# Patient Record
Sex: FEMALE | Race: WHITE | NOT HISPANIC OR LATINO | URBAN - METROPOLITAN AREA
[De-identification: names, ages, dates, MRNs, and addresses within clinical notes are randomized per-mention and may not be internally consistent; named-entity substitution may affect disease eponyms.]

---
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
## 2021-01-25 DIAGNOSIS — Z30.011 ENCOUNTER FOR INITIAL PRESCRIPTION OF CONTRACEPTIVE PILLS: ICD-10-CM

## 2021-01-25 RX ORDER — NORETHINDRONE ACETATE AND ETHINYL ESTRADIOL 1MG-20(21)
1 KIT ORAL
Qty: 84 TAB | Refills: 0 | Status: SHIPPED | OUTPATIENT
Start: 2021-01-25

## 2021-03-02 DIAGNOSIS — E10.9 TYPE 1 DIABETES MELLITUS WITHOUT COMPLICATION (HCC): ICD-10-CM

## 2021-03-02 RX ORDER — INSULIN ASPART 100 [IU]/ML
INJECTION, SOLUTION INTRAVENOUS; SUBCUTANEOUS
Qty: 15 ML | Refills: 0 | Status: SHIPPED | OUTPATIENT
Start: 2021-03-02 | End: 2024-02-09 | Stop reason: SDUPTHER

## 2021-10-04 RX ORDER — NORETHINDRONE ACETATE AND ETHINYL ESTRADIOL 1MG-20(24)
KIT ORAL
Qty: 28 TABLET | Refills: 11 | Status: SHIPPED | OUTPATIENT
Start: 2021-10-04

## 2021-10-04 NOTE — TELEPHONE ENCOUNTER
Received request via: Pharmacy    Was the patient seen in the last year in this department? Yes    Does the patient have an active prescription (recently filled or refills available) for medication(s) requested? No   PEDIATRICS

## 2022-01-22 ENCOUNTER — TELEPHONE (OUTPATIENT)
Dept: MEDICAL GROUP | Facility: OTHER | Age: 22
End: 2022-01-22

## 2022-01-22 DIAGNOSIS — E10.9 TYPE 1 DIABETES MELLITUS WITHOUT COMPLICATION (HCC): ICD-10-CM

## 2022-01-25 RX ORDER — PROCHLORPERAZINE 25 MG/1
1 SUPPOSITORY RECTAL
Qty: 9 EACH | Refills: 3 | Status: SHIPPED | OUTPATIENT
Start: 2022-01-25

## 2022-01-25 RX ORDER — PROCHLORPERAZINE 25 MG/1
1 SUPPOSITORY RECTAL
Qty: 1 EACH | Refills: 3 | Status: SHIPPED | OUTPATIENT
Start: 2022-01-25

## 2024-02-09 ENCOUNTER — HOSPITAL ENCOUNTER (OUTPATIENT)
Facility: MEDICAL CENTER | Age: 24
End: 2024-02-09
Attending: FAMILY MEDICINE
Payer: COMMERCIAL

## 2024-02-09 ENCOUNTER — OFFICE VISIT (OUTPATIENT)
Dept: MEDICAL GROUP | Facility: LAB | Age: 24
End: 2024-02-09
Payer: COMMERCIAL

## 2024-02-09 VITALS
SYSTOLIC BLOOD PRESSURE: 110 MMHG | RESPIRATION RATE: 16 BRPM | HEIGHT: 64 IN | WEIGHT: 169 LBS | HEART RATE: 96 BPM | TEMPERATURE: 98.2 F | DIASTOLIC BLOOD PRESSURE: 60 MMHG | BODY MASS INDEX: 28.85 KG/M2 | OXYGEN SATURATION: 96 %

## 2024-02-09 DIAGNOSIS — M25.562 CHRONIC PAIN OF BOTH KNEES: ICD-10-CM

## 2024-02-09 DIAGNOSIS — Z76.89 ENCOUNTER TO ESTABLISH CARE WITH NEW DOCTOR: ICD-10-CM

## 2024-02-09 DIAGNOSIS — Z13.220 SCREENING FOR HYPERLIPIDEMIA: ICD-10-CM

## 2024-02-09 DIAGNOSIS — Z11.3 SCREEN FOR STD (SEXUALLY TRANSMITTED DISEASE): ICD-10-CM

## 2024-02-09 DIAGNOSIS — E10.9 TYPE 1 DIABETES MELLITUS WITHOUT COMPLICATION (HCC): ICD-10-CM

## 2024-02-09 DIAGNOSIS — G89.29 CHRONIC PAIN OF BOTH KNEES: ICD-10-CM

## 2024-02-09 DIAGNOSIS — Z13.29 SCREENING FOR THYROID DISORDER: ICD-10-CM

## 2024-02-09 DIAGNOSIS — Z13.0 SCREENING FOR DEFICIENCY ANEMIA: ICD-10-CM

## 2024-02-09 DIAGNOSIS — Z23 NEED FOR VACCINATION: ICD-10-CM

## 2024-02-09 DIAGNOSIS — F41.9 ANXIETY: ICD-10-CM

## 2024-02-09 DIAGNOSIS — M25.561 CHRONIC PAIN OF BOTH KNEES: ICD-10-CM

## 2024-02-09 PROCEDURE — 3078F DIAST BP <80 MM HG: CPT | Performed by: FAMILY MEDICINE

## 2024-02-09 PROCEDURE — 99204 OFFICE O/P NEW MOD 45 MIN: CPT | Mod: 25 | Performed by: FAMILY MEDICINE

## 2024-02-09 PROCEDURE — 82570 ASSAY OF URINE CREATININE: CPT

## 2024-02-09 PROCEDURE — 90472 IMMUNIZATION ADMIN EACH ADD: CPT | Performed by: FAMILY MEDICINE

## 2024-02-09 PROCEDURE — 87491 CHLMYD TRACH DNA AMP PROBE: CPT

## 2024-02-09 PROCEDURE — 90471 IMMUNIZATION ADMIN: CPT | Performed by: FAMILY MEDICINE

## 2024-02-09 PROCEDURE — 87591 N.GONORRHOEAE DNA AMP PROB: CPT

## 2024-02-09 PROCEDURE — 82043 UR ALBUMIN QUANTITATIVE: CPT

## 2024-02-09 PROCEDURE — 90715 TDAP VACCINE 7 YRS/> IM: CPT | Performed by: FAMILY MEDICINE

## 2024-02-09 PROCEDURE — 3074F SYST BP LT 130 MM HG: CPT | Performed by: FAMILY MEDICINE

## 2024-02-09 PROCEDURE — 90651 9VHPV VACCINE 2/3 DOSE IM: CPT | Performed by: FAMILY MEDICINE

## 2024-02-09 RX ORDER — INSULIN DETEMIR 100 [IU]/ML
15 INJECTION, SOLUTION SUBCUTANEOUS EVERY EVENING
Qty: 45 ML | Refills: 6 | Status: SHIPPED | OUTPATIENT
Start: 2024-02-09

## 2024-02-09 RX ORDER — INSULIN ASPART 100 [IU]/ML
INJECTION, SOLUTION INTRAVENOUS; SUBCUTANEOUS
COMMUNITY
End: 2024-02-09

## 2024-02-09 RX ORDER — INSULIN ASPART 100 [IU]/ML
INJECTION, SOLUTION INTRAVENOUS; SUBCUTANEOUS
Qty: 15 ML | Refills: 0 | Status: SHIPPED | OUTPATIENT
Start: 2024-02-09 | End: 2024-02-16

## 2024-02-09 RX ORDER — NORETHINDRONE ACETATE AND ETHINYL ESTRADIOL 1MG-20(21)
1 KIT ORAL DAILY
COMMUNITY
End: 2024-02-09

## 2024-02-09 NOTE — PROGRESS NOTES
CC: Here to establish care    HPI: New patient, accompanied with her mother today  Danni presents today, discussed the following today:    1. Type 1 diabetes mellitus without complication  Chronic, new to me.  Diagnosed at the age of 10-year-old.  Patient on insulin, she said in the past she was on insulin pump but she had unfavorable experience so she is using long acting insulin and insulin per sliding scale.  Requesting referral to see endocrinology.  Does not recall when was her last A1c done or what is the result.  She said maybe around 10 or 9  Denies hypoglycemia episodes.  On Levemir 12 units at night and she takes 8 units as per sliding scale with each meal  And sometimes she takes it 4 or 5 times a day whenever she checks her blood sugar and finds it high.  2. Encounter to establish care with new doctor  23-year-old female with past medical history significant for type 1 diabetes, history of anxiety, reviewed past medical problems, past surgical history, family/social history today, patient is working as a .    3. Chronic pain of both knees  Chronic ongoing, bilateral knee pain would like to be checked out and possibly refer her to see orthopedics.  Denies trauma or injury at this time    4. Screen for STD (sexually transmitted disease)  Sexually active with 1 partner, requesting STD screening low risk    5. Need for vaccination  Denies acute illness or fever    6. Anxiety  Chronic ongoing, new to me.  Reports anxiety, would like to have referral to talk to a therapist.  Denies intentions to harm self or others    Patient Active Problem List    Diagnosis Date Noted    Long-term insulin use (HCC) 04/22/2020    Encounter for initial prescription of contraceptive pills 04/22/2020    Arthralgia of both knees 03/06/2019    Immunization deficiency 03/06/2019    Type 1 diabetes mellitus without complication (HCC) 12/20/2017    Anxiousness 12/20/2017       Current Outpatient Medications   Medication Sig  Dispense Refill    insulin detemir (LEVEMIR FLEXTOUCH) 100 UNIT/ML injection PEN Inject 15 Units under the skin every evening. 45 mL 6    insulin aspart (NOVOLOG FLEXPEN) 100 UNIT/ML injection PEN Inject 1-20 units at meals and snacks except the bedtime snack, maximum daily dose is 50 units. 15 mL 0    Insulin Pen Needle 32 G x 4 mm Use one pen needle in pen device to inject insulin four times daily. 100 Each 0    Continuous Blood Gluc Sensor (DEXCOM G6 SENSOR) Misc 1 Device every 10 days. 9 Each 3    Continuous Blood Gluc Transmit (DEXCOM G6 TRANSMITTER) Misc 1 Device every 3 months. 1 Each 3    norethindrone-ethinyl estradiol (BLISOVI FE 1/20) 1-20 MG-MCG per tablet Take 1 Tab by mouth every day. 84 Tab 0    Insulin Pen Needle 32 G x 4 mm (BD PEN NEEDLE MONSERRAT U/F) INJECT 6 TIMES DAILY 200 Each 5    TRUE METRIX BLOOD GLUCOSE TEST strip CHECK BLOOD SUGARS 6-10X PER DAY. 200 Strip 11    Glucagon, rDNA, (GLUCAGON EMERGENCY) 1 MG Kit 1 mg IM prn severe hypoglycemia 1 Kit 3    BLISOVI 24 FE 1-20 MG-MCG(24) Tab TAKE 1 TABLET BY MOUTH EVERY DAY 28 Tablet 11    acetone, urine, test (KETOSTIX) strip Test prn BS >300, up to 12 x per day. 100 Strip 11    Blood Glucose Monitoring Suppl (TRUE METRIX AIR GLUCOSE METER) Device 1 Each by Does not apply route as needed (check blood sugars). 1 Device 1     No current facility-administered medications for this visit.         Allergies as of 02/09/2024    (No Known Allergies)        ROS: Denies any chest pain, Shortness of breath, Changes bowel or bladder, Lower extremity edema.    Physical Exam:  Gen.: Well-developed, well-nourished, no apparent distress,pleasant and cooperative with the examination  Skin:  Warm and dry with good turgor. No rashes or suspicious lesions in visible areas  Eye: PERRLA, conjunctiva and sclera clear, lids normal  HEENT: Normocephalic/atraumatic, sinuses nontender with palpation, TMs clear, nares patent with pink mucosa and clear rhinorrhea, lips without  lesions, oropharynx clear.  Neck: Trachea midline,no masses or adenopathy  Thyroid: normal consistency and size. No masses or nodules. Not tender with palpation.  Cor: Regular rate and rhythm without murmur, gallop or rub.  Lungs: Respirations unlabored.Clear to auscultation with equal breath sounds bilaterally. No wheezes, rhonchi.  Abdomen: Soft nontender without hepatosplenomegaly or masses appreciated, normoactive bowel sounds. No hernias.  Extremities: No cyanosis, clubbing or edema, Symmetrical without deformities or malformations. Pulses 2+ and symmetrical both upper and lower extremities  Lymphatic: No abnormal adenopathy of the neck groin or axillae.  Psych: Alert and oriented x 3.Normal affect, judgement,insight and memory.    Monofilament testing with a 10 gram force: sensation intact: intact bilaterally  Visual Inspection: Feet without maceration, ulcers, fissures.  Pedal pulses: intact bilaterally     Assessment and Plan.   23 y.o. female here to establish care    1. Type 1 diabetes mellitus without complication (HCC)  Chronic, possibly uncontrolled recheck A1c, refills on medications follow-up with endocrinology.  Quality metrics reviewed, due for retinal exam next visit  - insulin detemir (LEVEMIR FLEXTOUCH) 100 UNIT/ML injection PEN; Inject 15 Units under the skin every evening.  Dispense: 45 mL; Refill: 6  - insulin aspart (NOVOLOG FLEXPEN) 100 UNIT/ML injection PEN; Inject 1-20 units at meals and snacks except the bedtime snack, maximum daily dose is 50 units.  Dispense: 15 mL; Refill: 0  - Insulin Pen Needle 32 G x 4 mm; Use one pen needle in pen device to inject insulin four times daily.  Dispense: 100 Each; Refill: 0  - Referral to Endocrinology  - Lipid Profile; Future  - Comp Metabolic Panel; Future  - HEMOGLOBIN A1C; Future  - Diabetic Monofilament LE Exam  - Microalbumin Creat Ratio Urine - Clinic Collect; Future    2. Encounter to establish care with new doctor  Reviewed medical history,  due for immunization.    3. Chronic pain of both knees  Chronic ongoing no history of trauma or injury, advised to do x-rays for further evaluation    - DX-KNEES-AP BILATERAL STANDING; Future    4. Screen for STD (sexually transmitted disease)   HEP C VIRUS ANTIBODY; Future  - HIV AG/AB COMBO ASSAY SCREENING; Future  - Chlamydia/GC, PCR (Urine); Future    5. Need for vaccination    - Gardasil 9  - Tdap =>6yo IM    6. Screening for hyperlipidemia    - Lipid Profile; Future    7. Screening for deficiency anemia    - CBC WITH DIFFERENTIAL; Future    8. Screening for thyroid disorder    - TSH WITH REFLEX TO FT4; Future    9. Anxiety  Chronic, no red flags at this time follow-up with behavioral health, will consider SSRI.  Rule out medical causes like thyroid disease.    - Referral to Behavioral Health       Please note that this dictation was created using voice recognition software. I have worked with consultants from the vendor as well as technical experts from [facility] to optimize the interface. I have made every reasonable attempt to correct obvious errors, but I expect that there are errors of grammar and possibly content that I did not discover before finalizing the note.

## 2024-02-10 LAB
C TRACH DNA SPEC QL NAA+PROBE: NEGATIVE
CREAT UR-MCNC: 81.24 MG/DL
MICROALBUMIN UR-MCNC: 16.6 MG/DL
MICROALBUMIN/CREAT UR: 204 MG/G (ref 0–30)
N GONORRHOEA DNA SPEC QL NAA+PROBE: NEGATIVE
SPECIMEN SOURCE: NORMAL

## 2024-02-12 DIAGNOSIS — E10.9 TYPE 1 DIABETES MELLITUS WITHOUT COMPLICATION (HCC): ICD-10-CM

## 2024-02-12 RX ORDER — LISINOPRIL 2.5 MG/1
2.5 TABLET ORAL DAILY
Qty: 90 TABLET | Refills: 3 | Status: SHIPPED | OUTPATIENT
Start: 2024-02-12

## 2024-02-14 DIAGNOSIS — E10.9 TYPE 1 DIABETES MELLITUS WITHOUT COMPLICATION (HCC): ICD-10-CM

## 2024-02-15 ENCOUNTER — APPOINTMENT (OUTPATIENT)
Dept: RADIOLOGY | Facility: MEDICAL CENTER | Age: 24
End: 2024-02-15
Attending: FAMILY MEDICINE
Payer: COMMERCIAL

## 2024-02-16 RX ORDER — INSULIN ASPART 100 [IU]/ML
INJECTION, SOLUTION INTRAVENOUS; SUBCUTANEOUS
Qty: 15 EACH | Refills: 3 | Status: SHIPPED | OUTPATIENT
Start: 2024-02-16

## 2024-02-27 ENCOUNTER — HOSPITAL ENCOUNTER (OUTPATIENT)
Dept: RADIOLOGY | Facility: MEDICAL CENTER | Age: 24
End: 2024-02-27
Attending: FAMILY MEDICINE
Payer: COMMERCIAL

## 2024-02-27 DIAGNOSIS — G89.29 CHRONIC PAIN OF BOTH KNEES: ICD-10-CM

## 2024-02-27 DIAGNOSIS — M25.562 CHRONIC PAIN OF BOTH KNEES: ICD-10-CM

## 2024-02-27 DIAGNOSIS — M25.561 CHRONIC PAIN OF BOTH KNEES: ICD-10-CM

## 2024-02-27 PROCEDURE — 73565 X-RAY EXAM OF KNEES: CPT

## 2024-04-19 DIAGNOSIS — E10.9 TYPE 1 DIABETES MELLITUS WITHOUT COMPLICATION (HCC): ICD-10-CM

## 2024-04-22 DIAGNOSIS — E10.9 TYPE 1 DIABETES MELLITUS WITHOUT COMPLICATION (HCC): ICD-10-CM

## 2024-04-22 RX ORDER — PROCHLORPERAZINE 25 MG/1
SUPPOSITORY RECTAL
Qty: 3 EACH | Refills: 8 | Status: SHIPPED | OUTPATIENT
Start: 2024-04-22

## 2024-04-22 RX ORDER — PEN NEEDLE, DIABETIC 31 GX5/16"
NEEDLE, DISPOSABLE MISCELLANEOUS
Qty: 100 EACH | Refills: 3 | Status: SHIPPED | OUTPATIENT
Start: 2024-04-22

## 2024-05-03 ENCOUNTER — OFFICE VISIT (OUTPATIENT)
Dept: URGENT CARE | Facility: PHYSICIAN GROUP | Age: 24
End: 2024-05-03
Payer: COMMERCIAL

## 2024-05-03 ENCOUNTER — HOSPITAL ENCOUNTER (OUTPATIENT)
Facility: MEDICAL CENTER | Age: 24
End: 2024-05-03
Payer: COMMERCIAL

## 2024-05-03 VITALS
RESPIRATION RATE: 12 BRPM | WEIGHT: 163.14 LBS | DIASTOLIC BLOOD PRESSURE: 72 MMHG | BODY MASS INDEX: 27.85 KG/M2 | SYSTOLIC BLOOD PRESSURE: 108 MMHG | OXYGEN SATURATION: 98 % | TEMPERATURE: 98.2 F | HEIGHT: 64 IN | HEART RATE: 92 BPM

## 2024-05-03 DIAGNOSIS — N39.0 ACUTE UTI: ICD-10-CM

## 2024-05-03 LAB
APPEARANCE UR: NORMAL
BILIRUB UR STRIP-MCNC: NEGATIVE MG/DL
COLOR UR AUTO: YELLOW
GLUCOSE BLD-MCNC: 296 MG/DL (ref 65–99)
GLUCOSE UR STRIP.AUTO-MCNC: >=1000 MG/DL
KETONES UR STRIP.AUTO-MCNC: 15 MG/DL
LEUKOCYTE ESTERASE UR QL STRIP.AUTO: NORMAL
NITRITE UR QL STRIP.AUTO: POSITIVE
PH UR STRIP.AUTO: 5.5 [PH] (ref 5–8)
POCT INT CON NEG: NEGATIVE
POCT INT CON POS: POSITIVE
POCT URINE PREGNANCY TEST: NEGATIVE
PROT UR QL STRIP: NEGATIVE MG/DL
RBC UR QL AUTO: NORMAL
SP GR UR STRIP.AUTO: 1.01
UROBILINOGEN UR STRIP-MCNC: 0.2 MG/DL

## 2024-05-03 PROCEDURE — 3074F SYST BP LT 130 MM HG: CPT

## 2024-05-03 PROCEDURE — 81002 URINALYSIS NONAUTO W/O SCOPE: CPT

## 2024-05-03 PROCEDURE — 3078F DIAST BP <80 MM HG: CPT

## 2024-05-03 PROCEDURE — 99213 OFFICE O/P EST LOW 20 MIN: CPT

## 2024-05-03 PROCEDURE — 81025 URINE PREGNANCY TEST: CPT

## 2024-05-03 PROCEDURE — 82962 GLUCOSE BLOOD TEST: CPT

## 2024-05-03 RX ORDER — NITROFURANTOIN 25; 75 MG/1; MG/1
100 CAPSULE ORAL 2 TIMES DAILY
Qty: 10 CAPSULE | Refills: 0 | Status: SHIPPED | OUTPATIENT
Start: 2024-05-03 | End: 2024-05-08

## 2024-05-03 ASSESSMENT — ENCOUNTER SYMPTOMS
NAUSEA: 0
SHORTNESS OF BREATH: 0
FEVER: 0
COUGH: 0
CHANGE IN BOWEL HABIT: 0
DIARRHEA: 0
HEADACHES: 0
FLANK PAIN: 0
VOMITING: 0
ABDOMINAL PAIN: 0
MYALGIAS: 0
SORE THROAT: 0
CHILLS: 0

## 2024-05-03 NOTE — PROGRESS NOTES
"Subjective:   Danni Smiley is a 24 y.o. female who presents for UTI (Dysuria, frequency, cloudy x5 days. Took azo once yesterday. Currently sexually active and using condoms. )      UTI  This is a new problem. Episode onset: x5 days. The problem has been gradually worsening. Associated symptoms include urinary symptoms. Pertinent negatives include no abdominal pain, change in bowel habit, chest pain, chills, congestion, coughing, fever, headaches, myalgias, nausea, rash, sore throat or vomiting. Nothing aggravates the symptoms. Treatments tried: AZO.       Review of Systems   Constitutional:  Negative for chills, fever and malaise/fatigue.   HENT:  Negative for congestion, ear pain, hearing loss and sore throat.    Respiratory:  Negative for cough and shortness of breath.    Cardiovascular:  Negative for chest pain.   Gastrointestinal:  Negative for abdominal pain, change in bowel habit, diarrhea, nausea and vomiting.   Genitourinary:  Positive for dysuria, frequency and urgency. Negative for flank pain and hematuria.   Musculoskeletal:  Negative for myalgias.   Skin:  Negative for rash.   Neurological:  Negative for headaches.       Medications, Allergies, and current problem list reviewed today in Epic.     Objective:     /72 (BP Location: Left arm, Patient Position: Sitting, BP Cuff Size: Adult)   Pulse 92   Temp 36.8 °C (98.2 °F) (Temporal)   Resp 12   Ht 1.626 m (5' 4\")   Wt 74 kg (163 lb 2.3 oz)   SpO2 98%     Physical Exam  Vitals and nursing note reviewed.   Constitutional:       Appearance: Normal appearance.   HENT:      Head: Normocephalic and atraumatic.      Right Ear: Tympanic membrane normal.      Left Ear: Tympanic membrane normal.      Nose: Nose normal.      Mouth/Throat:      Mouth: Mucous membranes are moist.   Eyes:      Conjunctiva/sclera: Conjunctivae normal.   Cardiovascular:      Rate and Rhythm: Normal rate.      Heart sounds: Normal heart sounds.   Pulmonary:      " Effort: Pulmonary effort is normal.   Abdominal:      General: Abdomen is flat.      Palpations: Abdomen is soft.      Tenderness: There is no abdominal tenderness. There is no right CVA tenderness or left CVA tenderness.   Genitourinary:     Vagina: No vaginal discharge.   Musculoskeletal:         General: Normal range of motion.      Cervical back: Normal range of motion.   Skin:     General: Skin is warm and dry.      Capillary Refill: Capillary refill takes less than 2 seconds.   Neurological:      Mental Status: She is alert and oriented to person, place, and time.   Psychiatric:         Mood and Affect: Mood normal.         Behavior: Behavior normal.       Results for orders placed or performed in visit on 05/03/24   POCT Urinalysis   Result Value Ref Range    POC Color Yellow Negative    POC Appearance Cloudy Negative    POC Glucose >=1,000 Negative mg/dL    POC Bilirubin Negative Negative mg/dL    POC Ketones 15 Negative mg/dL    POC Specific Gravity 1.015 <1.005 - >1.030    POC Blood Trace-lysed Negative    POC Urine PH 5.5 5.0 - 8.0    POC Protein Negative Negative mg/dL    POC Urobiligen 0.2 Negative (0.2) mg/dL    POC Nitrites Positive Negative    POC Leukocyte Esterase Trace Negative   POCT Pregnancy   Result Value Ref Range    POC Urine Pregnancy Test Negative     Internal Control Positive Positive     Internal Control Negative Negative    POCT glucose   Result Value Ref Range    Glucose - Accu-Ck 296 (A) 65 - 99 mg/dL         Assessment/Plan:       1. Acute UTI  POCT Urinalysis    POCT Pregnancy    POCT glucose    nitrofurantoin (MACROBID) 100 MG Cap    URINE CULTURE(NEW)        After ROS and physical exam patient has had urinary symptoms for the past 5 days including frequency, dysuria, and urgency.  Patient denies any hematuria or flank pain.  Patient has had no fevers.  Patient is a diabetic and well-controlled on insulin.  Urine results in office are inconclusive due to the Azo treatment.  But  since patient is diabetic and showing greater than 1000 glucose in urine POC glucose was performed.  Patient's glucose was 296 in office.  Patient reports that she recently went and worked out and avoids using too much of insulin prior to this to avoid low blood sugars.  Patient has not had any recent issues with blood sugars in the last time she was in DKA was roughly 6 years ago when she had the flu.  Patient was instructed to monitor for any worsening signs and symptoms.  Patient instructed to take antibiotic as prescribed.  Urine will be sent for culture and patient will be contacted about these results.  Patient was instructed that she can continue taking Azo for symptom management.  Patient instructed that if no improvement after 2 or 3 days on antibiotics to return to urgent care or emergency department for further management.    Differential diagnosis, natural history, and supportive care discussed. We also reviewed side effects of medication including allergic response, GI upset, tendon injury, rash, sedation etc. Patient and/or guardian voices understanding.      Advised the patient to follow-up with the primary care physician for recheck, reevaluation, and consideration of further management.    I personally reviewed prior external notes and test results pertinent to today's visit as well as additional imaging and testing completed in clinic today.     Please note that this dictation was created using voice recognition software. I have made every reasonable attempt to correct obvious errors, but I expect that there are errors of grammar and possibly content that I did not discover before finalizing the note.    This note was electronically signed by PLACIDO Aranda

## 2024-05-06 LAB
BACTERIA UR CULT: ABNORMAL
BACTERIA UR CULT: ABNORMAL
SIGNIFICANT IND 70042: ABNORMAL
SITE SITE: ABNORMAL
SOURCE SOURCE: ABNORMAL

## 2024-05-09 DIAGNOSIS — E10.9 TYPE 1 DIABETES MELLITUS WITHOUT COMPLICATION (HCC): ICD-10-CM

## 2024-05-10 RX ORDER — PROCHLORPERAZINE 25 MG/1
1 SUPPOSITORY RECTAL
Qty: 1 EACH | Refills: 6 | Status: SHIPPED | OUTPATIENT
Start: 2024-05-10

## 2024-05-10 NOTE — TELEPHONE ENCOUNTER
Received request via: Pharmacy    Was the patient seen in the last year in this department? Yes 2/9/24    Does the patient have an active prescription (recently filled or refills available) for medication(s) requested? No    Pharmacy Name: [facility]    Does the patient have MCFP Plus and need 100 day supply (blood pressure, diabetes and cholesterol meds only)? Patient does not have SCP

## 2024-05-22 ENCOUNTER — OFFICE VISIT (OUTPATIENT)
Dept: URGENT CARE | Facility: PHYSICIAN GROUP | Age: 24
End: 2024-05-22
Payer: COMMERCIAL

## 2024-05-22 ENCOUNTER — HOSPITAL ENCOUNTER (OUTPATIENT)
Facility: MEDICAL CENTER | Age: 24
End: 2024-05-22
Attending: PHYSICIAN ASSISTANT
Payer: COMMERCIAL

## 2024-05-22 VITALS
RESPIRATION RATE: 23 BRPM | HEIGHT: 64 IN | DIASTOLIC BLOOD PRESSURE: 60 MMHG | HEART RATE: 108 BPM | TEMPERATURE: 98.7 F | OXYGEN SATURATION: 96 % | SYSTOLIC BLOOD PRESSURE: 120 MMHG | WEIGHT: 161.82 LBS | BODY MASS INDEX: 27.63 KG/M2

## 2024-05-22 DIAGNOSIS — R30.0 DYSURIA: ICD-10-CM

## 2024-05-22 DIAGNOSIS — N39.0 RECURRENT UTI: ICD-10-CM

## 2024-05-22 DIAGNOSIS — N30.90 CYSTITIS: ICD-10-CM

## 2024-05-22 LAB
APPEARANCE UR: CLEAR
BILIRUB UR STRIP-MCNC: NEGATIVE MG/DL
COLOR UR AUTO: YELLOW
GLUCOSE UR STRIP.AUTO-MCNC: NORMAL MG/DL
KETONES UR STRIP.AUTO-MCNC: NORMAL MG/DL
LEUKOCYTE ESTERASE UR QL STRIP.AUTO: NORMAL
NITRITE UR QL STRIP.AUTO: NEGATIVE
PH UR STRIP.AUTO: 5.5 [PH] (ref 5–8)
POCT INT CON NEG: NEGATIVE
POCT INT CON POS: POSITIVE
POCT URINE PREGNANCY TEST: NEGATIVE
PROT UR QL STRIP: NEGATIVE MG/DL
RBC UR QL AUTO: NORMAL
SP GR UR STRIP.AUTO: 1.01
UROBILINOGEN UR STRIP-MCNC: NORMAL MG/DL

## 2024-05-22 PROCEDURE — 99213 OFFICE O/P EST LOW 20 MIN: CPT | Performed by: PHYSICIAN ASSISTANT

## 2024-05-22 PROCEDURE — 3074F SYST BP LT 130 MM HG: CPT | Performed by: PHYSICIAN ASSISTANT

## 2024-05-22 PROCEDURE — 81025 URINE PREGNANCY TEST: CPT | Performed by: PHYSICIAN ASSISTANT

## 2024-05-22 PROCEDURE — 3078F DIAST BP <80 MM HG: CPT | Performed by: PHYSICIAN ASSISTANT

## 2024-05-22 PROCEDURE — 81002 URINALYSIS NONAUTO W/O SCOPE: CPT | Performed by: PHYSICIAN ASSISTANT

## 2024-05-22 RX ORDER — SULFAMETHOXAZOLE AND TRIMETHOPRIM 800; 160 MG/1; MG/1
1 TABLET ORAL EVERY 12 HOURS
Qty: 10 TABLET | Refills: 0 | Status: SHIPPED | OUTPATIENT
Start: 2024-05-22 | End: 2024-05-27

## 2024-05-22 NOTE — PROGRESS NOTES
"Subjective:   Danni Smiley is a 24 y.o. female who presents for UTI (Sx started 3 day ago , hurts to pee. )  This is a very pleasant 23-year-old female who presents with chief complaint of dysuria, urgency, frequency x 1-1/2 days.  Seen on May 3 for similar, diagnosed with cystitis, urine culture positive for E. coli pansensitive.  Completed Macrobid course after this and noted resolution of symptoms.  Symptoms recurred yesterday.  She is sexually active using condoms.  She has no STI concerns.  She denies vaginal discharge itching or odor.  LMP 5-1.  She is diabetic.  She denies flank pain nausea or vomiting.  No fevers or chills.        Medications:  B-D ULTRAFINE III SHORT PEN Misc  BD Pen Needle Agata U/F Misc  Blisovi 24 Fe Tabs  Dexcom G6 Sensor Misc  Dexcom G6 Transmitter Misc  Glucagon Emergency Kit  Ketostix Strp  Levemir FlexTouch Sopn  lisinopril Tabs  norethindrone-ethinyl estradiol  NovoLOG FlexPen Sopn  True Metrix Air Glucose Meter Haydee  True Metrix Blood Glucose Test Strp    Allergies:             Patient has no known allergies.    Surgical History:       No past surgical history on file.    Past Social Hx:  Danni Smiley  reports that she has never smoked. She has never used smokeless tobacco. She reports that she does not currently use alcohol. She reports current drug use. Drug: Marijuana.     Past Family Hx:   Danni Smiley family history includes Thyroid in her mother.       Problem list, medications, and allergies reviewed by myself today in Epic.     Objective:     /60 (BP Location: Left arm, Patient Position: Sitting, BP Cuff Size: Large adult)   Pulse (!) 108   Temp 37.1 °C (98.7 °F) (Temporal)   Resp (!) 23   Ht 1.626 m (5' 4\")   Wt 73.4 kg (161 lb 13.1 oz)   LMP 04/30/2024 (Exact Date)   SpO2 96%   BMI 27.78 kg/m²     Physical Exam  Vitals and nursing note reviewed.   Constitutional:       General: She is not in acute distress.     Appearance: Normal " appearance. She is not ill-appearing, toxic-appearing or diaphoretic.      Comments: This is a nontoxic-appearing adult in no apparent distress   HENT:      Nose: Nose normal.   Eyes:      Extraocular Movements: Extraocular movements intact.   Cardiovascular:      Rate and Rhythm: Normal rate.      Pulses: Normal pulses.      Heart sounds: Normal heart sounds.   Pulmonary:      Effort: Pulmonary effort is normal. No respiratory distress.      Breath sounds: Normal breath sounds.   Abdominal:      General: There is no distension.      Palpations: Abdomen is soft.      Tenderness: There is no abdominal tenderness. There is no right CVA tenderness, left CVA tenderness, guarding or rebound.      Hernia: No hernia is present.      Comments: No CVA tenderness  No suprapubic tenderness   Musculoskeletal:      Cervical back: No rigidity.   Neurological:      Mental Status: She is alert and oriented to person, place, and time.   Psychiatric:         Mood and Affect: Mood normal.         Behavior: Behavior is cooperative.       Urine hCG negative  Assessment/Plan:     Diagnosis and Associated Orders:     1. Dysuria  - POCT Urinalysis  - POCT PREGNANCY  - URINE CULTURE(NEW); Future    2. Cystitis  - URINE CULTURE(NEW); Future  - sulfamethoxazole-trimethoprim (BACTRIM DS) 800-160 MG tablet; Take 1 Tablet by mouth every 12 hours for 5 days.  Dispense: 10 Tablet; Refill: 0    3. Recurrent UTI        Comments/MDM:  Reviewed patient's prior office visit.  Reviewed prior urine culture which was positive for E. coli, pansensitive.  UA today suggestive of recurrent UTI.  Will reculture.  Will start on Bactrim.  Discussed with patient signs and symptoms are consistent with a simple urinary tract infection. They are overall very well-appearing with normal vital signs and benign examination findings.  Increase fluid intake, AZO per manufacturers instructions for burning urination.    Urine culture: will call back only if positive and if  necessary change in therapy.     Advised to return to the Urgent Care or follow up with their PCP if symptoms are not improving in 2-3 days or sooner if any worsening symptoms such as fever, chills, abdominal pain, back/flank pain, nausea, vomiting, or any other concerns.     I personally reviewed prior external notes and test results pertinent to today's visit. Supportive care, natural history, differential diagnoses, and indications for immediate follow-up discussed. Return to clinic or go to ED if symptoms worsen or persist.  Red flag symptoms discussed.  Patient/Parent/Guardian voices understanding. Follow-up with your primary care provider in 3-5 days.  All side effects of medication discussed including allergic response, GI upset, tendon injury, rash, sedation etc    Please note that this dictation was created using voice recognition software. I have made a reasonable attempt to correct obvious errors, but I expect that there are errors of grammar and possibly content that I did not discover before finalizing the note.    This note was electronically signed by Fartun Boo PA-C

## 2024-05-25 LAB
BACTERIA UR CULT: NORMAL
SIGNIFICANT IND 70042: NORMAL
SITE SITE: NORMAL
SOURCE SOURCE: NORMAL

## 2024-05-31 ENCOUNTER — APPOINTMENT (OUTPATIENT)
Dept: MEDICAL GROUP | Facility: LAB | Age: 24
End: 2024-05-31
Payer: COMMERCIAL

## 2024-06-03 ENCOUNTER — APPOINTMENT (OUTPATIENT)
Dept: MEDICAL GROUP | Facility: LAB | Age: 24
End: 2024-06-03
Payer: COMMERCIAL

## 2024-06-03 VITALS
BODY MASS INDEX: 28 KG/M2 | DIASTOLIC BLOOD PRESSURE: 70 MMHG | RESPIRATION RATE: 16 BRPM | OXYGEN SATURATION: 100 % | WEIGHT: 164 LBS | HEIGHT: 64 IN | HEART RATE: 90 BPM | TEMPERATURE: 97.8 F | SYSTOLIC BLOOD PRESSURE: 126 MMHG

## 2024-06-03 DIAGNOSIS — Z23 NEED FOR VACCINATION: ICD-10-CM

## 2024-06-03 DIAGNOSIS — E10.9 TYPE 1 DIABETES MELLITUS WITHOUT COMPLICATION (HCC): ICD-10-CM

## 2024-06-03 DIAGNOSIS — Z30.9 ENCOUNTER FOR CONTRACEPTIVE MANAGEMENT, UNSPECIFIED TYPE: ICD-10-CM

## 2024-06-03 LAB
POCT INT CON NEG: NEGATIVE
POCT INT CON POS: POSITIVE
POCT URINE PREGNANCY TEST: NEGATIVE

## 2024-06-03 PROCEDURE — 90471 IMMUNIZATION ADMIN: CPT | Performed by: FAMILY MEDICINE

## 2024-06-03 PROCEDURE — 3074F SYST BP LT 130 MM HG: CPT | Performed by: FAMILY MEDICINE

## 2024-06-03 PROCEDURE — 99214 OFFICE O/P EST MOD 30 MIN: CPT | Mod: 25 | Performed by: FAMILY MEDICINE

## 2024-06-03 PROCEDURE — 90472 IMMUNIZATION ADMIN EACH ADD: CPT | Performed by: FAMILY MEDICINE

## 2024-06-03 PROCEDURE — 90621 MENB-FHBP VACC 2/3 DOSE IM: CPT | Performed by: FAMILY MEDICINE

## 2024-06-03 PROCEDURE — 90651 9VHPV VACCINE 2/3 DOSE IM: CPT | Performed by: FAMILY MEDICINE

## 2024-06-03 PROCEDURE — 81025 URINE PREGNANCY TEST: CPT | Performed by: FAMILY MEDICINE

## 2024-06-03 PROCEDURE — 3078F DIAST BP <80 MM HG: CPT | Performed by: FAMILY MEDICINE

## 2024-06-03 RX ORDER — DESOGESTREL AND ETHINYL ESTRADIOL 0.15-0.03
1 KIT ORAL DAILY
Qty: 84 TABLET | Refills: 3 | Status: SHIPPED | OUTPATIENT
Start: 2024-06-03

## 2024-06-04 NOTE — PROGRESS NOTES
"Chief Complaint   Patient presents with    Contraception       Verbal consent was acquired by the patient to use [employer] ambient listening note generation during this visit Yes      HPI:  History of Present Illness  The patient is a 24-year-old female who presents for evaluation of multiple medical concerns. She is accompanied by an adult female, her mother.    The patient expresses a desire for contraception, her only concern about contraception she wants to make sure that it does not affect her weight lifting exercise that she started recently   primarily due to her engagement in weight lifting and muscle building. She expresses uncertainty regarding the potential side effects of oral contraceptives. H    er previous contraceptive regimen involved the use of oral contraceptives, which ceased her menstrual cycle without any associated side effects. Her last menstrual period commenced on 06 / 01/2024, and she last engaged in sexual intercourse last Friday, coinciding with a single male partner.    The patient expresses interest in receiving the HPV vaccine.    The patient has not yet consulted with an optometrist.    The patient has a diagnosis of type 1 diabetes, which is exacerbated by her weight lifting. She is not currently under the care of an endocrinologist and has expressed disinterest in using an insulin pump.              Exam:         /70 (BP Location: Left arm, Patient Position: Sitting, BP Cuff Size: Adult)   Pulse 90   Temp 36.6 °C (97.8 °F)   Resp 16   Ht 1.626 m (5' 4\")   Wt 74.4 kg (164 lb)   LMP 06/01/2024   SpO2 100%   BMI 28.15 kg/m²   Gen.: Well-developed, well-nourished, no apparent distress, pleasant and cooperative with the examination  HEENT: Normocephalic/atraumatic, sinuses nontender with palpation, TMs clear, nares patent with pink mucosa and clear rhinorrhea, oropharynx clear  Neck: No JVD or bruits, no adenopathy  Cor: Regular rate and rhythm without murmur gallop or " rub  Lungs: Clear to auscultation with equal breath sounds bilaterally. No wheezes, rhonchi.  Abdomen: Soft nontender without hepatosplenomegaly or masses appreciated, normoactive bowel sounds  Extremities: No cyanosis, clubbing or edema      A/P    1. Encounter for contraceptive management, unspecified type  Negative pregnancy test, patient to start birth control pills after discussing all the options for hormonal and nonhormonal contraceptive methods, patient elected for pills.  Prescription sent to pharmacy, discussed condoms for STD prevention  - POCT Pregnancy  - desogestrel-ethinyl estradiol (APRI) 0.15-30 MG-MCG per tablet; Take 1 Tablet by mouth every day.  Dispense: 84 Tablet; Refill: 3    2. Need for vaccination    - Gardasil 9  - Meningococcal (IM) Group B    3. Type 1 diabetes mellitus without complication (HCC)  Chronic, A1c is above 7, discussed follow-up with endocrinology for discussion of insulin pump.  - Referral to Endocrinology     Please note that this dictation was created using voice recognition software. I have worked with consultants from the vendor as well as technical experts from [facility] to optimize the interface. I have made every reasonable attempt to correct obvious errors, but I expect that there are errors of grammar and possibly content that I did not discover before finalizing the note.

## 2024-06-10 ENCOUNTER — HOSPITAL ENCOUNTER (OUTPATIENT)
Dept: LAB | Facility: MEDICAL CENTER | Age: 24
End: 2024-06-10
Attending: FAMILY MEDICINE
Payer: COMMERCIAL

## 2024-06-10 DIAGNOSIS — Z13.29 SCREENING FOR THYROID DISORDER: ICD-10-CM

## 2024-06-10 DIAGNOSIS — E10.9 TYPE 1 DIABETES MELLITUS WITHOUT COMPLICATION (HCC): ICD-10-CM

## 2024-06-10 DIAGNOSIS — Z13.0 SCREENING FOR DEFICIENCY ANEMIA: ICD-10-CM

## 2024-06-10 DIAGNOSIS — Z11.3 SCREEN FOR STD (SEXUALLY TRANSMITTED DISEASE): ICD-10-CM

## 2024-06-10 DIAGNOSIS — Z13.220 SCREENING FOR HYPERLIPIDEMIA: ICD-10-CM

## 2024-06-10 LAB
ALBUMIN SERPL BCP-MCNC: 4.3 G/DL (ref 3.2–4.9)
ALBUMIN/GLOB SERPL: 1.3 G/DL
ALP SERPL-CCNC: 100 U/L (ref 30–99)
ALT SERPL-CCNC: 10 U/L (ref 2–50)
ANION GAP SERPL CALC-SCNC: 13 MMOL/L (ref 7–16)
AST SERPL-CCNC: 14 U/L (ref 12–45)
BASOPHILS # BLD AUTO: 0.7 % (ref 0–1.8)
BASOPHILS # BLD: 0.04 K/UL (ref 0–0.12)
BILIRUB SERPL-MCNC: 0.4 MG/DL (ref 0.1–1.5)
BUN SERPL-MCNC: 16 MG/DL (ref 8–22)
CALCIUM ALBUM COR SERPL-MCNC: 9.4 MG/DL (ref 8.5–10.5)
CALCIUM SERPL-MCNC: 9.6 MG/DL (ref 8.5–10.5)
CHLORIDE SERPL-SCNC: 102 MMOL/L (ref 96–112)
CHOLEST SERPL-MCNC: 195 MG/DL (ref 100–199)
CO2 SERPL-SCNC: 22 MMOL/L (ref 20–33)
CREAT SERPL-MCNC: 0.76 MG/DL (ref 0.5–1.4)
EOSINOPHIL # BLD AUTO: 0.08 K/UL (ref 0–0.51)
EOSINOPHIL NFR BLD: 1.4 % (ref 0–6.9)
ERYTHROCYTE [DISTWIDTH] IN BLOOD BY AUTOMATED COUNT: 41.5 FL (ref 35.9–50)
EST. AVERAGE GLUCOSE BLD GHB EST-MCNC: 197 MG/DL
FASTING STATUS PATIENT QL REPORTED: NORMAL
GFR SERPLBLD CREATININE-BSD FMLA CKD-EPI: 113 ML/MIN/1.73 M 2
GLOBULIN SER CALC-MCNC: 3.2 G/DL (ref 1.9–3.5)
GLUCOSE SERPL-MCNC: 208 MG/DL (ref 65–99)
HBA1C MFR BLD: 8.5 % (ref 4–5.6)
HCT VFR BLD AUTO: 41.9 % (ref 37–47)
HDLC SERPL-MCNC: 58 MG/DL
HGB BLD-MCNC: 13.9 G/DL (ref 12–16)
HIV 1+2 AB+HIV1 P24 AG SERPL QL IA: NORMAL
IMM GRANULOCYTES # BLD AUTO: 0.05 K/UL (ref 0–0.11)
IMM GRANULOCYTES NFR BLD AUTO: 0.9 % (ref 0–0.9)
LDLC SERPL CALC-MCNC: 124 MG/DL
LYMPHOCYTES # BLD AUTO: 1.82 K/UL (ref 1–4.8)
LYMPHOCYTES NFR BLD: 31.6 % (ref 22–41)
MCH RBC QN AUTO: 29.7 PG (ref 27–33)
MCHC RBC AUTO-ENTMCNC: 33.2 G/DL (ref 32.2–35.5)
MCV RBC AUTO: 89.5 FL (ref 81.4–97.8)
MONOCYTES # BLD AUTO: 0.32 K/UL (ref 0–0.85)
MONOCYTES NFR BLD AUTO: 5.6 % (ref 0–13.4)
NEUTROPHILS # BLD AUTO: 3.45 K/UL (ref 1.82–7.42)
NEUTROPHILS NFR BLD: 59.8 % (ref 44–72)
NRBC # BLD AUTO: 0 K/UL
NRBC BLD-RTO: 0 /100 WBC (ref 0–0.2)
PLATELET # BLD AUTO: 273 K/UL (ref 164–446)
PMV BLD AUTO: 11.2 FL (ref 9–12.9)
POTASSIUM SERPL-SCNC: 4.2 MMOL/L (ref 3.6–5.5)
PROT SERPL-MCNC: 7.5 G/DL (ref 6–8.2)
RBC # BLD AUTO: 4.68 M/UL (ref 4.2–5.4)
SODIUM SERPL-SCNC: 137 MMOL/L (ref 135–145)
TRIGL SERPL-MCNC: 67 MG/DL (ref 0–149)
WBC # BLD AUTO: 5.8 K/UL (ref 4.8–10.8)

## 2024-06-10 PROCEDURE — 80053 COMPREHEN METABOLIC PANEL: CPT

## 2024-06-10 PROCEDURE — 85025 COMPLETE CBC W/AUTO DIFF WBC: CPT

## 2024-06-10 PROCEDURE — 80061 LIPID PANEL: CPT

## 2024-06-10 PROCEDURE — 84443 ASSAY THYROID STIM HORMONE: CPT

## 2024-06-10 PROCEDURE — 36415 COLL VENOUS BLD VENIPUNCTURE: CPT

## 2024-06-10 PROCEDURE — 86803 HEPATITIS C AB TEST: CPT

## 2024-06-10 PROCEDURE — 83036 HEMOGLOBIN GLYCOSYLATED A1C: CPT

## 2024-06-10 PROCEDURE — 87389 HIV-1 AG W/HIV-1&-2 AB AG IA: CPT

## 2024-06-11 LAB
HCV AB SER QL: NORMAL
TSH SERPL DL<=0.005 MIU/L-ACNC: 1.11 UIU/ML (ref 0.38–5.33)

## 2024-07-09 ENCOUNTER — DOCUMENTATION (OUTPATIENT)
Dept: HEALTH INFORMATION MANAGEMENT | Facility: OTHER | Age: 24
End: 2024-07-09
Payer: COMMERCIAL

## 2024-10-17 ENCOUNTER — APPOINTMENT (OUTPATIENT)
Dept: MEDICAL GROUP | Age: 24
End: 2024-10-17
Payer: COMMERCIAL

## 2024-12-19 ENCOUNTER — OFFICE VISIT (OUTPATIENT)
Dept: URGENT CARE | Facility: PHYSICIAN GROUP | Age: 24
End: 2024-12-19
Payer: COMMERCIAL

## 2024-12-19 ENCOUNTER — HOSPITAL ENCOUNTER (OUTPATIENT)
Facility: MEDICAL CENTER | Age: 24
End: 2024-12-19
Attending: PHYSICIAN ASSISTANT
Payer: COMMERCIAL

## 2024-12-19 VITALS
HEART RATE: 115 BPM | DIASTOLIC BLOOD PRESSURE: 80 MMHG | OXYGEN SATURATION: 97 % | TEMPERATURE: 99 F | WEIGHT: 172.29 LBS | RESPIRATION RATE: 16 BRPM | SYSTOLIC BLOOD PRESSURE: 120 MMHG | BODY MASS INDEX: 29.41 KG/M2 | HEIGHT: 64 IN

## 2024-12-19 DIAGNOSIS — R30.0 DYSURIA: ICD-10-CM

## 2024-12-19 DIAGNOSIS — N39.0 URINARY TRACT INFECTION WITHOUT HEMATURIA, SITE UNSPECIFIED: ICD-10-CM

## 2024-12-19 LAB
APPEARANCE UR: NORMAL
BILIRUB UR STRIP-MCNC: NORMAL MG/DL
COLOR UR AUTO: NORMAL
GLUCOSE UR STRIP.AUTO-MCNC: NORMAL MG/DL
KETONES UR STRIP.AUTO-MCNC: NORMAL MG/DL
LEUKOCYTE ESTERASE UR QL STRIP.AUTO: NORMAL
NITRITE UR QL STRIP.AUTO: POSITIVE
PH UR STRIP.AUTO: 5 [PH] (ref 5–8)
POCT INT CON NEG: NEGATIVE
POCT INT CON POS: POSITIVE
POCT URINE PREGNANCY TEST: NEGATIVE
PROT UR QL STRIP: NORMAL MG/DL
RBC UR QL AUTO: NORMAL
SP GR UR STRIP.AUTO: 1.02
UROBILINOGEN UR STRIP-MCNC: NORMAL MG/DL

## 2024-12-19 PROCEDURE — 81002 URINALYSIS NONAUTO W/O SCOPE: CPT | Performed by: PHYSICIAN ASSISTANT

## 2024-12-19 PROCEDURE — 99213 OFFICE O/P EST LOW 20 MIN: CPT | Performed by: PHYSICIAN ASSISTANT

## 2024-12-19 PROCEDURE — 87077 CULTURE AEROBIC IDENTIFY: CPT

## 2024-12-19 PROCEDURE — 81025 URINE PREGNANCY TEST: CPT | Performed by: PHYSICIAN ASSISTANT

## 2024-12-19 PROCEDURE — 87086 URINE CULTURE/COLONY COUNT: CPT

## 2024-12-19 PROCEDURE — 3079F DIAST BP 80-89 MM HG: CPT | Performed by: PHYSICIAN ASSISTANT

## 2024-12-19 PROCEDURE — 3074F SYST BP LT 130 MM HG: CPT | Performed by: PHYSICIAN ASSISTANT

## 2024-12-19 PROCEDURE — 87186 SC STD MICRODIL/AGAR DIL: CPT

## 2024-12-19 RX ORDER — CEFDINIR 300 MG/1
300 CAPSULE ORAL 2 TIMES DAILY
Qty: 10 CAPSULE | Refills: 0 | Status: SHIPPED | OUTPATIENT
Start: 2024-12-19 | End: 2024-12-24

## 2024-12-19 ASSESSMENT — ENCOUNTER SYMPTOMS
ABDOMINAL PAIN: 0
NAUSEA: 0
VOMITING: 0
FLANK PAIN: 0
EYE DISCHARGE: 0
EYE REDNESS: 0
FEVER: 0

## 2024-12-19 ASSESSMENT — FIBROSIS 4 INDEX: FIB4 SCORE: 0.37

## 2024-12-19 NOTE — PROGRESS NOTES
Subjective     Danni Stefanie Smiley is a 24 y.o. female who presents with Dysuria (Painful urination, smell to urine, cloudiness in urine, frequent urination, sharp pain when urinating X 1 week )            UTI  This is a new problem. Episode onset: x 1 week ago. The problem has been gradually worsening. Associated symptoms include urinary symptoms (Patient reports associated dysuria with urinary frequency and urinary urgency.  She reports no hematuria.  She also reports no flank pain.). Pertinent negatives include no abdominal pain, fever, nausea or vomiting. Treatments tried: OTC Azo.     The patient's past medical history is significant for type 1 diabetes.  The patient states her blood sugar has been running a little higher, which she attributes to her birth control medication.    PMH:  has a past medical history of Diabetes ([facility]).  MEDS:   Current Outpatient Medications:     desogestrel-ethinyl estradiol (APRI) 0.15-30 MG-MCG per tablet, Take 1 Tablet by mouth every day., Disp: 84 Tablet, Rfl: 3    Continuous Glucose Transmitter (DEXCOM G6 TRANSMITTER) Misc, 1 Device every 3 months., Disp: 1 Each, Rfl: 6    Continuous Blood Gluc Sensor (DEXCOM G6 SENSOR) Misc, USE 1 DEVICE AS DIRECTED TO CHECK BG 4X A DAY. CHANGE EVERY 10 DAYS, Disp: 3 Each, Rfl: 8    Insulin Pen Needle (B-D ULTRAFINE III SHORT PEN), USE ONE PEN NEEDLE IN PEN DEVICE TO INJECT INSULIN FOUR TIMES DAILY., Disp: 100 Each, Rfl: 3    insulin aspart (NOVOLOG FLEXPEN) 100 UNIT/ML injection PEN, INJECT 1-20 UNITS AT MEALS AND SNACKS EXCEPT THE BEDTIME SNACK, MAXIMUM DAILY DOSE IS 50 UNITS., Disp: 15 Each, Rfl: 3    lisinopril (PRINIVIL) 2.5 MG Tab, Take 1 Tablet by mouth every day., Disp: 90 Tablet, Rfl: 3    insulin detemir (LEVEMIR FLEXTOUCH) 100 UNIT/ML injection PEN, Inject 15 Units under the skin every evening., Disp: 45 mL, Rfl: 6    Insulin Pen Needle 32 G x 4 mm (BD PEN NEEDLE MONSERRAT U/F), INJECT 6 TIMES DAILY, Disp: 200 Each, Rfl: 5    TRUE  "METRIX BLOOD GLUCOSE TEST strip, CHECK BLOOD SUGARS 6-10X PER DAY., Disp: 200 Strip, Rfl: 11    Glucagon, rDNA, (GLUCAGON EMERGENCY) 1 MG Kit, 1 mg IM prn severe hypoglycemia, Disp: 1 Kit, Rfl: 3    Blood Glucose Monitoring Suppl (TRUE METRIX AIR GLUCOSE METER) Device, 1 Each by Does not apply route as needed (check blood sugars)., Disp: 1 Device, Rfl: 1    norethindrone-ethinyl estradiol (BLISOVI FE 1/20) 1-20 MG-MCG per tablet, Take 1 Tab by mouth every day. (Patient not taking: Reported on 12/19/2024), Disp: 84 Tab, Rfl: 0    acetone, urine, test (KETOSTIX) strip, Test prn BS >300, up to 12 x per day. (Patient not taking: Reported on 5/22/2024), Disp: 100 Strip, Rfl: 11  ALLERGIES: No Known Allergies  SURGHX: History reviewed. No pertinent surgical history.  SOCHX:  reports that she has never smoked. She has never used smokeless tobacco. She reports that she does not currently use alcohol. She reports current drug use. Drug: Marijuana.  FH: Family history was reviewed, no pertinent findings to report    Review of Systems   Constitutional:  Negative for fever.   Eyes:  Negative for discharge and redness.   Gastrointestinal:  Negative for abdominal pain, nausea and vomiting.   Genitourinary:  Positive for dysuria, frequency and urgency. Negative for flank pain and hematuria.              Objective     /80 (BP Location: Right arm, Patient Position: Sitting, BP Cuff Size: Adult)   Pulse (!) 115   Temp 37.2 °C (99 °F) (Temporal)   Resp 16   Ht 1.626 m (5' 4\")   Wt 78.1 kg (172 lb 4.6 oz)   SpO2 97%   BMI 29.57 kg/m²      Physical Exam  Constitutional:       General: She is not in acute distress.     Appearance: Normal appearance. She is well-developed. She is not ill-appearing.   HENT:      Head: Normocephalic and atraumatic.      Right Ear: External ear normal.      Left Ear: External ear normal.   Eyes:      Extraocular Movements: Extraocular movements intact.      Conjunctiva/sclera: Conjunctivae " normal.   Cardiovascular:      Rate and Rhythm: Normal rate and regular rhythm.      Heart sounds: Normal heart sounds.   Pulmonary:      Effort: Pulmonary effort is normal. No respiratory distress.      Breath sounds: Normal breath sounds. No wheezing.   Abdominal:      Palpations: Abdomen is soft.      Tenderness: There is no abdominal tenderness. There is no right CVA tenderness or left CVA tenderness.   Musculoskeletal:         General: Normal range of motion.      Cervical back: Normal range of motion and neck supple.   Skin:     General: Skin is warm and dry.   Neurological:      Mental Status: She is alert and oriented to person, place, and time.                  Progress:  The patient's POCT urinalysis today in clinic is inconclusive secondary to Azo use.  Results for orders placed or performed in visit on 12/19/24   POCT Pregnancy    Collection Time: 12/19/24 11:24 AM   Result Value Ref Range    POC Urine Pregnancy Test Negative     Internal Control Positive Positive     Internal Control Negative Negative    POCT Urinalysis    Collection Time: 12/19/24 11:26 AM   Result Value Ref Range    POC Color orange Negative    POC Appearance cloudy Negative    POC Glucose 500 mg/dL Negative mg/dL    POC Bilirubin small Negative mg/dL    POC Ketones 80 mg/dL Negative mg/dL    POC Specific Gravity 1.025 <1.005 - >1.030    POC Blood small Negative    POC Urine PH 5.0 5.0 - 8.0    POC Protein 100 mg/dL Negative mg/dL    POC Urobiligen 1.0 E.U./dL Negative (0.2) mg/dL    POC Nitrites positive Negative    POC Leukocyte Esterase large Negative         Urine Culture - pending            Assessment & Plan        Assessment & Plan  Dysuria    Orders:    POCT Urinalysis    POCT Pregnancy    URINE CULTURE(NEW); Future    Urinary tract infection without hematuria, site unspecified    Orders:    cefdinir (OMNICEF) 300 MG Cap; Take 1 Capsule by mouth 2 times a day for 5 days.            The patient's presenting symptoms and  physical exam findings are consistent with dysuria likely secondary to an acute urinary tract infection.  The patient's physical exam today in clinic was normal.  The patient is nontoxic and appears in no acute distress.  The patient's vital signs are stable and within normal limits, with the exception of a mildly elevated heart rate.  The patient's heart was found to be elevated upon arrival, but returned to within normal limits on examination.  She is afebrile today in clinic.  The patient's POCT urinalysis today in clinic was inconclusive secondary to Azo use.  The patient's POCT pregnancy test was negative.  Will culture the patient's urine to identify a likely bacterial source.  Based on the patient's presenting symptoms, will prescribe the patient cefdinir for presumed urinary tract infection.  Advised the patient to monitor for worsening signs or symptoms.  Recommend OTC medications and supportive care for symptomatic management.  Recommend patient follow-up with primary care as needed.  Discussed return precautions with the patient, and she verbalized understanding.    Differential diagnoses, supportive care, and indications for immediate follow-up discussed with patient.   Instructed to return to clinic or nearest emergency department for any change in condition, further concerns, or worsening of symptoms.    OTC Tylenol or Motrin for fever/discomfort.  Drink plenty of fluids  Follow-up with PCP  Return to clinic or go to the ED if symptoms worsen or fail to improve, or if the patient should develop worsening/increasing urinary symptoms, hematuria, flank pain, abdominal pain, nausea/vomiting, fever/chills, and/or any concerning symptoms.    Discussed plan with the patient, and she agrees to the above.     I personally reviewed prior external notes and test results pertinent to today's visit.  I have independently reviewed and interpreted all diagnostics ordered during this urgent care visit.     Please note  that this dictation was created using voice recognition software. I have made every reasonable attempt to correct obvious errors, but I expect that there may be errors of grammar and possibly content that I did not discover before finalizing the note.     This note was electronically signed by Kimmie Isaacs PA-C

## 2025-02-09 NOTE — PROGRESS NOTES
Chief Complaint:  Consult requested by Pcp Not In Computer for initial evaluation of Type 1 Diabetes Mellitus    HPI:   Danni Smiley is a 24 y.o. female was referred to endocrinology service by PCP to establish care and T1DM management.  Here with her mom - Lorena.     DM history:  - diagnosed in 2010, presented with DKA  - hospitalizations for DKA/HHS/severe hyperglycemia/severe hypoglycemia in the past: at presentation, 2nd time with DKA with flu in 2017  - prior therapy: MDI now, previously was on tandem  - known DM complications: nephropathy with microalbuminuria vs microalbuminuria secondary to UTI  - latest HbA1C - 8.1% (2025)  - pertinent PMHx:   -- dyslipidemia  -- denies NAFLD, HTN; CAD/PAD/CHF/ CVA     Current therapy:  Novolog ISF 1:10, ISF 1:50 for BG > 150    Levemir 17 units HS 10:30 pm     Tolerates medications: well  Compliant: yes    Other important medications:  Desogestrel-ethinyl estradiol 0.15 - 30  Lisinopril 2.5 mg    BG control:  CGM type - Dexcom G6  Period - 24 - 2025  Data were reviewed and discussed with the patient  Active time - 97%  ABG - 218 ±76 mg/dL  GV - 35%  GMI - 8.5%  TIR - 35%  TAR - high - 33%, very high - 31%  TBR - low - 1%, very low < 1 %  24 - 2025       Hypoglycemic episodes:  Hypoglycemic symptoms: Yes  Hypoglycemic unawareness: NA  Treatment: sandwich  Severe hypoglycemia: No  Has medical bracelet/necklace: No  Has glucagon emergency kit: Yes, but     Past Medical History:  Patient Active Problem List    Diagnosis Date Noted    Long-term insulin use (HCC) 2020    Encounter for initial prescription of contraceptive pills 2020    Arthralgia of both knees 2019    Immunization deficiency 2019    Type 1 diabetes mellitus without complication (HCC) 2017    Anxiousness 2017     Past Surgical History:  No past surgical history on file.     Allergies:  Patient has no known allergies.     Social  History:  Social History     Socioeconomic History    Marital status: Single     Spouse name: Not on file    Number of children: Not on file    Years of education: Not on file    Highest education level: Not on file   Occupational History     Comment:    Tobacco Use    Smoking status: Never    Smokeless tobacco: Never   Vaping Use    Vaping status: Some Days   Substance and Sexual Activity    Alcohol use: Not Currently     Comment: occationally    Drug use: Yes     Types: Marijuana    Sexual activity: Yes     Partners: Male     Birth control/protection: Condom Male   Other Topics Concern    Behavioral problems Not Asked    Interpersonal relationships Not Asked    Sad or not enjoying activities Not Asked    Suicidal thoughts Not Asked    Poor school performance Not Asked    Reading difficulties Not Asked    Speech difficulties Not Asked    Writing difficulties Not Asked    Inadequate sleep Not Asked    Excessive TV viewing Not Asked    Excessive video game use Not Asked    Inadequate exercise Not Asked    Sports related Not Asked    Poor diet Not Asked    Family concerns for drug/alcohol abuse Not Asked    Poor oral hygiene Not Asked    Bike safety Not Asked    Family concerns vehicle safety Not Asked   Social History Narrative    Not on file     Social Drivers of Health     Financial Resource Strain: Not on file   Food Insecurity: Not on file   Transportation Needs: Not on file   Physical Activity: Not on file   Stress: Not on file   Social Connections: Not on file   Intimate Partner Violence: Not on file   Housing Stability: Not on file      Family History:   Family History   Problem Relation Age of Onset    Thyroid Mother      Current Medications:  Current Outpatient Medications:     desogestrel-ethinyl estradiol (APRI) 0.15-30 MG-MCG per tablet, Take 1 Tablet by mouth every day., Disp: 84 Tablet, Rfl: 3    Continuous Glucose Transmitter (DEXCOM G6 TRANSMITTER) Misc, 1 Device every 3 months., Disp: 1  "Each, Rfl: 6    Continuous Blood Gluc Sensor (DEXCOM G6 SENSOR) Misc, USE 1 DEVICE AS DIRECTED TO CHECK BG 4X A DAY. CHANGE EVERY 10 DAYS, Disp: 3 Each, Rfl: 8    Insulin Pen Needle (B-D ULTRAFINE III SHORT PEN), USE ONE PEN NEEDLE IN PEN DEVICE TO INJECT INSULIN FOUR TIMES DAILY., Disp: 100 Each, Rfl: 3    insulin aspart (NOVOLOG FLEXPEN) 100 UNIT/ML injection PEN, INJECT 1-20 UNITS AT MEALS AND SNACKS EXCEPT THE BEDTIME SNACK, MAXIMUM DAILY DOSE IS 50 UNITS., Disp: 15 Each, Rfl: 3    lisinopril (PRINIVIL) 2.5 MG Tab, Take 1 Tablet by mouth every day., Disp: 90 Tablet, Rfl: 3    insulin detemir (LEVEMIR FLEXTOUCH) 100 UNIT/ML injection PEN, Inject 15 Units under the skin every evening., Disp: 45 mL, Rfl: 6    norethindrone-ethinyl estradiol (BLISOVI FE 1/20) 1-20 MG-MCG per tablet, Take 1 Tab by mouth every day. (Patient not taking: Reported on 12/19/2024), Disp: 84 Tab, Rfl: 0    Insulin Pen Needle 32 G x 4 mm (BD PEN NEEDLE MONSERRAT U/F), INJECT 6 TIMES DAILY, Disp: 200 Each, Rfl: 5    TRUE METRIX BLOOD GLUCOSE TEST strip, CHECK BLOOD SUGARS 6-10X PER DAY., Disp: 200 Strip, Rfl: 11    Glucagon, rDNA, (GLUCAGON EMERGENCY) 1 MG Kit, 1 mg IM prn severe hypoglycemia, Disp: 1 Kit, Rfl: 3    acetone, urine, test (KETOSTIX) strip, Test prn BS >300, up to 12 x per day. (Patient not taking: Reported on 5/22/2024), Disp: 100 Strip, Rfl: 11    Blood Glucose Monitoring Suppl (TRUE METRIX AIR GLUCOSE METER) Device, 1 Each by Does not apply route as needed (check blood sugars)., Disp: 1 Device, Rfl: 1    PHYSICAL EXAM:   Vital signs: /70   Pulse (!) 118   Ht 1.626 m (5' 4\") Comment: pt stated  Wt 79.6 kg (175 lb 8 oz)   SpO2 96%   BMI 30.12 kg/m²   GENERAL: Well-developed, well-nourished  in no apparent distress.   EYE: No ocular and eyelid asymmetry, Anicteric sclerae,  PERRL, No exophthalmos or lidlag  HENT: Hearing grossly intact, Normocephalic, atraumatic.  NECK: Supple. Trachea midline.   CARDIOVASCULAR: Regular " rate and rhythm.   LUNGS: No dyspnea  ABDOMEN: Soft, nondistended  EXTREMITIES: No clubbing, cyanosis, or edema.   NEUROLOGICAL: Cranial nerves II-XII are grossly intact No visible tremor with both outstretched hands  SKIN: No rashes, lesions. Turgor is normal.    Labs:  - reviewed  HbA1C/BG/Hb:   Reference Range  10/19/18  03/06/19  06/10/24    HbA1C 4.0 - 5.6 % 9.4 (H) 9.2 (H) 8.5 (H)      Reference Range 06/10/24    Hb 12.0 - 16.0 g/dL 13.9   Hct 37.0 - 47.0 % 41.9     C-peptide/glucose/T1DM Abs:  No data    Kidney function/MACR:   Reference Range 02/09/24  06/10/24    Creatinine 0.50 - 1.40 mg/dL  0.76   GFR (CKD-EPI) >60 mL/min/1.73 m 2  113   MACR 0 - 30 mg/g 204 (H)      LFTs/FIB-4:   Reference Range 06/10/24    Platelet Count 164 - 446 K/uL 273   AST(SGOT) 12 - 45 U/L 14   ALT(SGPT) 2 - 50 U/L 10   ALP 30 - 99 U/L 100 (H)     Lipid panel:   Reference Range  06/10/24    Triglycerides 0 - 149 mg/dL 67   HDL >=40 mg/dL 58   LDL <100 mg/dL 124 (H)     Hypothyroidism screening:   Reference Range 06/10/24    TSH 0.380 - 5.330 uIU/mL 1.110       ASSESSMENT/PLAN:   # Type 1 diabetes with hyperglycemia  - duration x 14 years  - on MDI  - HbA1C - 8.1% (2/2025) vs GMI - 8.5% x Dexcom G6     Microvascular complications: nephropathy with microalbuminuria, denies peripheral neuropathy, retinopathy  Macrovascular complications: denies CAD, CVA, PAD  Associated comorbidities: dyslipidemia     DM complication screening:  Labs reviewed:  MACR - 204 (+), last checked in 2/2024  Creat/GFR , last checked in  LDL - 99 - not at target, last checked in      Patient is taking statin: on   Patient is taking ASA: no  Patient is taking ACE/ARB: on lisinopril 2.5 mg     Last eye exam: due for eye exam  Last foot exam: due for foot exam     Home medications:  Novolog ISF 1:10, ISF 1:50 for BG > 150    Levemir 17 units HS 10:30 pm     Discussion:  - patient has longstanding type 1 diabetes on MDI  - discussed that Levemir is not the  preferred basal insulin as it does not last 24 hours, will transition to Toujeo  - discussed general principles of basal insulin adjustment  - patient is interested in closed-loop insulin pump, discussed available options on the market, initially patient was interested in tubeless pump, but then she swing towards Tandem and Medtronic pumps also because they have better algorithms; given contact information for medical representatives to clarify cost issues  - encouraged patient to report all her doses of insulin in here CGM application  - will transition from Dexcom G6 -> G7 for patient's convenience    Plan:  Discussed with the patient goals for DM management:  Fasting BG 90 - 130  2 hrs postprandial  - 180  HbA1C < 7.0%  ABG < 154 mg/dL  TIR > 70%  TBR < 4%     Therapy adjustments:  - stop Levemir -> start Toujeo 17 units daily  - continue Novolog ICR 1:10, ISF 1:50 for BG > 150  - encouraged patient to report all dose of insulin in CGM application  - given contact information for medical representatives for Tandem and Medtronic pumps  - provide refills for the patient's medications    3.  Transition from Dexcom G6 -> G7  4. Given instructions for foot care  5. Discussed hypoglycemia symptoms, management, given glucagon prescription.   6.  Referral for eye exam.  7.  Will do foot exam at upcoming visit.    # Diabetes nephropathy with microalbuminuria vs microalbuminuria due to UTI  - already on ACEI  - repeat MACR  - keep BP < 130/80 mm Hg  - provide better glycemic control    # Obesity class 1  - continue assessment of patient's  - optimization of insulin therapy with insulin pump might help    RTC: 2 mo    Total time (face-to-face and non-face-to face time): 60 min -  extensive discussion of diagnoses, treatment, prognosis, medical charts, lab review, documentation.     Plan reviewed with the patient and agreed with plan.  All questions answered to patient's satisfaction.  Thank you kindly for allowing me  to participate in the diabetes care plan for this patient.    Teena Romero MD    CC:   Pcp Not In Computer

## 2025-02-11 ENCOUNTER — OFFICE VISIT (OUTPATIENT)
Dept: ENDOCRINOLOGY | Facility: MEDICAL CENTER | Age: 25
End: 2025-02-11
Attending: STUDENT IN AN ORGANIZED HEALTH CARE EDUCATION/TRAINING PROGRAM
Payer: COMMERCIAL

## 2025-02-11 VITALS
BODY MASS INDEX: 29.96 KG/M2 | DIASTOLIC BLOOD PRESSURE: 70 MMHG | SYSTOLIC BLOOD PRESSURE: 122 MMHG | HEIGHT: 64 IN | HEART RATE: 118 BPM | WEIGHT: 175.5 LBS | OXYGEN SATURATION: 96 %

## 2025-02-11 DIAGNOSIS — E10.9 TYPE 1 DIABETES MELLITUS WITHOUT COMPLICATION (HCC): Primary | ICD-10-CM

## 2025-02-11 LAB
HBA1C MFR BLD: 8.1 % (ref ?–5.8)
POCT INT CON NEG: NEGATIVE
POCT INT CON POS: POSITIVE

## 2025-02-11 PROCEDURE — 83036 HEMOGLOBIN GLYCOSYLATED A1C: CPT | Performed by: STUDENT IN AN ORGANIZED HEALTH CARE EDUCATION/TRAINING PROGRAM

## 2025-02-11 PROCEDURE — 3074F SYST BP LT 130 MM HG: CPT | Performed by: STUDENT IN AN ORGANIZED HEALTH CARE EDUCATION/TRAINING PROGRAM

## 2025-02-11 PROCEDURE — 95250 CONT GLUC MNTR PHYS/QHP EQP: CPT | Performed by: STUDENT IN AN ORGANIZED HEALTH CARE EDUCATION/TRAINING PROGRAM

## 2025-02-11 PROCEDURE — 99204 OFFICE O/P NEW MOD 45 MIN: CPT | Performed by: STUDENT IN AN ORGANIZED HEALTH CARE EDUCATION/TRAINING PROGRAM

## 2025-02-11 PROCEDURE — 95251 CONT GLUC MNTR ANALYSIS I&R: CPT | Performed by: STUDENT IN AN ORGANIZED HEALTH CARE EDUCATION/TRAINING PROGRAM

## 2025-02-11 PROCEDURE — 3078F DIAST BP <80 MM HG: CPT | Performed by: STUDENT IN AN ORGANIZED HEALTH CARE EDUCATION/TRAINING PROGRAM

## 2025-02-11 PROCEDURE — 99213 OFFICE O/P EST LOW 20 MIN: CPT | Performed by: STUDENT IN AN ORGANIZED HEALTH CARE EDUCATION/TRAINING PROGRAM

## 2025-02-11 RX ORDER — GLUCAGON 3 MG/1
3 POWDER NASAL
Qty: 1 EACH | Refills: 11 | Status: SHIPPED | OUTPATIENT
Start: 2025-02-11

## 2025-02-11 RX ORDER — INSULIN ASPART 100 [IU]/ML
INJECTION, SOLUTION INTRAVENOUS; SUBCUTANEOUS
Qty: 45 ML | Refills: 4 | Status: SHIPPED | OUTPATIENT
Start: 2025-02-11

## 2025-02-11 RX ORDER — INSULIN GLARGINE 300 U/ML
17 INJECTION, SOLUTION SUBCUTANEOUS
Qty: 9 ML | Refills: 4 | Status: SHIPPED | OUTPATIENT
Start: 2025-02-11

## 2025-02-11 RX ORDER — BLOOD SUGAR DIAGNOSTIC
1 STRIP MISCELLANEOUS 4 TIMES DAILY
Qty: 400 EACH | Refills: 4 | Status: SHIPPED | OUTPATIENT
Start: 2025-02-11

## 2025-02-11 RX ORDER — ACYCLOVIR 400 MG/1
1 TABLET ORAL
Qty: 9 EACH | Refills: 4 | Status: SHIPPED | OUTPATIENT
Start: 2025-02-11

## 2025-02-11 RX ORDER — CALCIUM CITRATE/VITAMIN D3 200MG-6.25
1 TABLET ORAL
Qty: 200 STRIP | Refills: 5 | Status: SHIPPED | OUTPATIENT
Start: 2025-02-11

## 2025-02-11 ASSESSMENT — FIBROSIS 4 INDEX: FIB4 SCORE: 0.37

## 2025-02-12 NOTE — Clinical Note
REFERRAL APPROVAL NOTICE         Sent on February 12, 2025                   Danni Stefanie Smiley  [address] [facility] [place], [place] [address]                   Dear Ms. Smiley,    After a careful review of the medical information and benefit coverage, [facility] has processed your referral. See below for additional details.    If applicable, you must be actively enrolled with your insurance for coverage of the authorized service. If you have any questions regarding your coverage, please contact your insurance directly.    REFERRAL INFORMATION   Referral #:  [id number]  Referred-To Provider    Referred-By Provider:  Ophthalmology    Teena Romero M.D.   [facility]      [address]  [address]  [facility] [address]  [phone] [address] [facility] [address]  [phone]    Referral Start Date:  02/11/2025  Referral End Date:   02/11/2026             SCHEDULING  If you do not already have an appointment, please call [phone] to make an appointment.     MORE INFORMATION  If you do not already have a [email or web address] account, sign up at: [email or web address]  You can access your medical information, make appointments, see lab results, billing information, and more.  If you have questions regarding this referral, please contact  the [facility] Referrals department at:             [phone]. Monday - Friday 8:00AM - 5:00PM.     Sincerely,    [facility]

## 2025-03-29 NOTE — PROGRESS NOTES
Follow up Endocrinology Visit  Initial consult/last visit on:   Referred by:     Chief complaint:  Danni Smiley, 25 y.o., female, who is here for follow up    Interval history:   - since last visit    Chief Complaint:  Consult requested by Pcp Not In Computer for initial evaluation of Type 1 Diabetes Mellitus    HPI:   Danni Smiley is a 24 y.o. female was referred to endocrinology service by PCP to establish care and T1DM management.  Here with her mom - Lorena.     DM history:  - diagnosed in 2010, presented with DKA  - hospitalizations for DKA/HHS/severe hyperglycemia/severe hypoglycemia in the past: at presentation, 2nd time with DKA with flu in 2017  - prior therapy: MDI now, previously was on tandem  - known DM complications: nephropathy with microalbuminuria vs microalbuminuria secondary to UTI  - latest HbA1C - 8.1% (2025)  - pertinent PMHx:   -- dyslipidemia  -- denies NAFLD, HTN; CAD/PAD/CHF/ CVA     Current therapy:  Novolog ISF 1:10, ISF 1:50 for BG > 150    Levemir 17 units HS 10:30 pm     Tolerates medications: well  Compliant: yes    Other important medications:  Desogestrel-ethinyl estradiol 0.15 - 30  Lisinopril 2.5 mg    BG control:  CGM type - Dexcom G6  Period - 24 - 2025  Data were reviewed and discussed with the patient  Active time - 97%  ABG - 218 ±76 mg/dL  GV - 35%  GMI - 8.5%  TIR - 35%  TAR - high - 33%, very high - 31%  TBR - low - 1%, very low < 1 %  24 - 2025       Hypoglycemic episodes:  Hypoglycemic symptoms: Yes  Hypoglycemic unawareness: NA  Treatment: sandwich  Severe hypoglycemia: No  Has medical bracelet/necklace: No  Has glucagon emergency kit: Yes, but     Past Medical History:  Patient Active Problem List    Diagnosis Date Noted    Long-term insulin use (HCC) 2020    Encounter for initial prescription of contraceptive pills 2020    Arthralgia of both knees 2019    Immunization deficiency 2019    Type 1  diabetes mellitus without complication (HCC) 12/20/2017    Anxiousness 12/20/2017     Past Surgical History:  No past surgical history on file.     Allergies:  Patient has no known allergies.     Social History:  Social History     Socioeconomic History    Marital status: Single     Spouse name: Not on file    Number of children: Not on file    Years of education: Not on file    Highest education level: Not on file   Occupational History     Comment:    Tobacco Use    Smoking status: Never    Smokeless tobacco: Never   Vaping Use    Vaping status: Some Days   Substance and Sexual Activity    Alcohol use: Not Currently     Comment: occationally    Drug use: Yes     Types: Marijuana    Sexual activity: Yes     Partners: Male     Birth control/protection: Condom Male   Other Topics Concern    Behavioral problems Not Asked    Interpersonal relationships Not Asked    Sad or not enjoying activities Not Asked    Suicidal thoughts Not Asked    Poor school performance Not Asked    Reading difficulties Not Asked    Speech difficulties Not Asked    Writing difficulties Not Asked    Inadequate sleep Not Asked    Excessive TV viewing Not Asked    Excessive video game use Not Asked    Inadequate exercise Not Asked    Sports related Not Asked    Poor diet Not Asked    Family concerns for drug/alcohol abuse Not Asked    Poor oral hygiene Not Asked    Bike safety Not Asked    Family concerns vehicle safety Not Asked   Social History Narrative    Not on file     Social Drivers of Health     Financial Resource Strain: Not on file   Food Insecurity: Not on file   Transportation Needs: Not on file   Physical Activity: Not on file   Stress: Not on file   Social Connections: Not on file   Intimate Partner Violence: Not on file   Housing Stability: Not on file      Family History:   Family History   Problem Relation Age of Onset    Thyroid Mother      Current Medications:  Current Outpatient Medications:     Continuous Glucose  Sensor (DEXCOM G7 SENSOR) Misc, 1 Each every 10 days., Disp: 9 Each, Rfl: 4    Glucagon (BAQSIMI ONE PACK) 3 mg/dose, Administer 1 Spray into one nostril 1 time a day as needed (for severe hypoglycemia)., Disp: 1 Each, Rfl: 11    Insulin Glargine, 1 Unit Dial, (TOUJEO SOLOSTAR) 300 UNIT/ML Solution Pen-injector, Inject 17 Units under the skin at bedtime., Disp: 9 mL, Rfl: 4    glucose blood (TRUE METRIX BLOOD GLUCOSE TEST) strip, 1 Strip by Other route 2 (two) times a day., Disp: 200 Strip, Rfl: 5    Insulin Pen Needle (PEN NEEDLES) 32G X 6 MM Misc, 1 Each 4 times a day., Disp: 400 Each, Rfl: 4    Urine Glucose-Ketones Test Strip, 1 Strip by In Vitro route as needed (to check if nausea, vomiting, abdominal pain, BGs> 300s)., Disp: 10 Strip, Rfl: 11    insulin aspart (NOVOLOG FLEXPEN) 100 UNIT/ML injection PEN, Inject 1-20 units at meals and snacks except the bedtime snack, maximum daily dose is 50 units., Disp: 45 mL, Rfl: 4    desogestrel-ethinyl estradiol (APRI) 0.15-30 MG-MCG per tablet, Take 1 Tablet by mouth every day., Disp: 84 Tablet, Rfl: 3    Continuous Glucose Transmitter (DEXCOM G6 TRANSMITTER) Misc, 1 Device every 3 months., Disp: 1 Each, Rfl: 6    lisinopril (PRINIVIL) 2.5 MG Tab, Take 1 Tablet by mouth every day., Disp: 90 Tablet, Rfl: 3    norethindrone-ethinyl estradiol (BLISOVI FE 1/20) 1-20 MG-MCG per tablet, Take 1 Tab by mouth every day. (Patient not taking: Reported on 5/22/2024), Disp: 84 Tab, Rfl: 0    Glucagon, rDNA, (GLUCAGON EMERGENCY) 1 MG Kit, 1 mg IM prn severe hypoglycemia, Disp: 1 Kit, Rfl: 3    acetone, urine, test (KETOSTIX) strip, Test prn BS >300, up to 12 x per day., Disp: 100 Strip, Rfl: 11    Blood Glucose Monitoring Suppl (TRUE METRIX AIR GLUCOSE METER) Device, 1 Each by Does not apply route as needed (check blood sugars)., Disp: 1 Device, Rfl: 1    PHYSICAL EXAM:   Vital signs: There were no vitals taken for this visit.  GENERAL: Well-developed, well-nourished  in no apparent  distress.   EYE: No ocular and eyelid asymmetry, Anicteric sclerae,  PERRL, No exophthalmos or lidlag  HENT: Hearing grossly intact, Normocephalic, atraumatic.  NECK: Supple. Trachea midline.   CARDIOVASCULAR: Regular rate and rhythm.   LUNGS: No dyspnea  ABDOMEN: Soft, nondistended  EXTREMITIES: No clubbing, cyanosis, or edema.   NEUROLOGICAL: Cranial nerves II-XII are grossly intact No visible tremor with both outstretched hands  SKIN: No rashes, lesions. Turgor is normal.    Labs:  - reviewed  HbA1C/BG/Hb:   Reference Range  10/19/18  03/06/19  06/10/24    HbA1C 4.0 - 5.6 % 9.4 (H) 9.2 (H) 8.5 (H)      Reference Range 06/10/24    Hb 12.0 - 16.0 g/dL 13.9   Hct 37.0 - 47.0 % 41.9     C-peptide/glucose/T1DM Abs:  No data    Kidney function/MACR:   Reference Range 02/09/24  06/10/24    Creatinine 0.50 - 1.40 mg/dL  0.76   GFR (CKD-EPI) >60 mL/min/1.73 m 2  113   MACR 0 - 30 mg/g 204 (H)      LFTs/FIB-4:   Reference Range 06/10/24    Platelet Count 164 - 446 K/uL 273   AST(SGOT) 12 - 45 U/L 14   ALT(SGPT) 2 - 50 U/L 10   ALP 30 - 99 U/L 100 (H)     Lipid panel:   Reference Range  06/10/24    Triglycerides 0 - 149 mg/dL 67   HDL >=40 mg/dL 58   LDL <100 mg/dL 124 (H)     Hypothyroidism screening:   Reference Range 06/10/24    TSH 0.380 - 5.330 uIU/mL 1.110       ASSESSMENT/PLAN:   # Type 1 diabetes with hyperglycemia  - duration x 14 years  - on MDI  - HbA1C - 8.1% (2/2025) vs GMI - 8.5% x Dexcom G6     Microvascular complications: nephropathy with microalbuminuria, denies peripheral neuropathy, retinopathy  Macrovascular complications: denies CAD, CVA, PAD  Associated comorbidities: dyslipidemia     DM complication screening:  Labs reviewed:  MACR - 204 (+), last checked in 2/2024  Creat/GFR , last checked in  LDL - 99 - not at target, last checked in      Patient is taking statin: on   Patient is taking ASA: no  Patient is taking ACE/ARB: on lisinopril 2.5 mg     Last eye exam: due for eye exam  Last foot exam: due  for foot exam     Home medications:  Novolog ISF 1:10, ISF 1:50 for BG > 150    Levemir 17 units HS 10:30 pm     Discussion:  - patient has longstanding type 1 diabetes on MDI  - discussed that Levemir is not the preferred basal insulin as it does not last 24 hours, will transition to Toujeo  - discussed general principles of basal insulin adjustment  - patient is interested in closed-loop insulin pump, discussed available options on the market, initially patient was interested in tubeless pump, but then she swing towards Tandem and Medtronic pumps also because they have better algorithms; given contact information for medical representatives to clarify cost issues  - encouraged patient to report all her doses of insulin in here CGM application  - will transition from Dexcom G6 -> G7 for patient's convenience    Plan:  Discussed with the patient goals for DM management:  Fasting BG 90 - 130  2 hrs postprandial  - 180  HbA1C < 7.0%  ABG < 154 mg/dL  TIR > 70%  TBR < 4%     Therapy adjustments:  - stop Levemir -> start Toujeo 17 units daily  - continue Novolog ICR 1:10, ISF 1:50 for BG > 150  - encouraged patient to report all dose of insulin in CGM application  - given contact information for medical representatives for Tandem and Medtronic pumps  - provide refills for the patient's medications    3.  Transition from Dexcom G6 -> G7  4. Given instructions for foot care  5. Discussed hypoglycemia symptoms, management, given glucagon prescription.   6.  Referral for eye exam.  7.  Will do foot exam at upcoming visit.    # Diabetes nephropathy with microalbuminuria vs microalbuminuria due to UTI  - already on ACEI  - repeat MACR  - keep BP < 130/80 mm Hg  - provide better glycemic control    # Obesity class 1  - continue assessment of patient's  - optimization of insulin therapy with insulin pump might help    RTC: 2 mo    Total time (face-to-face and non-face-to face time): 60 min -  extensive discussion of  diagnoses, treatment, prognosis, medical charts, lab review, documentation.     Plan reviewed with the patient and agreed with plan.  All questions answered to patient's satisfaction.  Thank you kindly for allowing me to participate in the diabetes care plan for this patient.    Teena Romero MD    CC:   Pcp Not In Computer

## 2025-04-08 ENCOUNTER — APPOINTMENT (OUTPATIENT)
Dept: ENDOCRINOLOGY | Facility: MEDICAL CENTER | Age: 25
End: 2025-04-08
Attending: STUDENT IN AN ORGANIZED HEALTH CARE EDUCATION/TRAINING PROGRAM
Payer: COMMERCIAL

## 2025-05-13 ENCOUNTER — HOSPITAL ENCOUNTER (OUTPATIENT)
Facility: MEDICAL CENTER | Age: 25
End: 2025-05-13
Attending: PHYSICIAN ASSISTANT
Payer: COMMERCIAL

## 2025-05-13 ENCOUNTER — OFFICE VISIT (OUTPATIENT)
Dept: MEDICAL GROUP | Facility: PHYSICIAN GROUP | Age: 25
End: 2025-05-13
Payer: COMMERCIAL

## 2025-05-13 VITALS
DIASTOLIC BLOOD PRESSURE: 82 MMHG | BODY MASS INDEX: 30.05 KG/M2 | TEMPERATURE: 98.5 F | HEART RATE: 92 BPM | SYSTOLIC BLOOD PRESSURE: 126 MMHG | WEIGHT: 176 LBS | RESPIRATION RATE: 16 BRPM | HEIGHT: 64 IN | OXYGEN SATURATION: 98 %

## 2025-05-13 DIAGNOSIS — M25.561 CHRONIC PAIN OF BOTH KNEES: ICD-10-CM

## 2025-05-13 DIAGNOSIS — R30.0 DYSURIA: ICD-10-CM

## 2025-05-13 DIAGNOSIS — Z13.0 SCREENING FOR ENDOCRINE, METABOLIC AND IMMUNITY DISORDER: ICD-10-CM

## 2025-05-13 DIAGNOSIS — Z13.29 SCREENING FOR ENDOCRINE, METABOLIC AND IMMUNITY DISORDER: ICD-10-CM

## 2025-05-13 DIAGNOSIS — N64.52 NIPPLE DISCHARGE: ICD-10-CM

## 2025-05-13 DIAGNOSIS — Z13.228 SCREENING FOR ENDOCRINE, METABOLIC AND IMMUNITY DISORDER: ICD-10-CM

## 2025-05-13 DIAGNOSIS — Z11.3 SCREEN FOR STD (SEXUALLY TRANSMITTED DISEASE): ICD-10-CM

## 2025-05-13 DIAGNOSIS — G89.29 CHRONIC PAIN OF BOTH KNEES: ICD-10-CM

## 2025-05-13 DIAGNOSIS — E10.9 TYPE 1 DIABETES MELLITUS WITHOUT COMPLICATION (HCC): ICD-10-CM

## 2025-05-13 DIAGNOSIS — Z23 NEED FOR VACCINATION: ICD-10-CM

## 2025-05-13 DIAGNOSIS — M25.562 CHRONIC PAIN OF BOTH KNEES: ICD-10-CM

## 2025-05-13 LAB
APPEARANCE UR: NORMAL
BILIRUB UR STRIP-MCNC: NORMAL MG/DL
COLOR UR AUTO: YELLOW
GLUCOSE UR STRIP.AUTO-MCNC: 500 MG/DL
KETONES UR STRIP.AUTO-MCNC: NORMAL MG/DL
LEUKOCYTE ESTERASE UR QL STRIP.AUTO: NORMAL
NITRITE UR QL STRIP.AUTO: NORMAL
PH UR STRIP.AUTO: 6 [PH] (ref 5–8)
PROT UR QL STRIP: NORMAL MG/DL
RBC UR QL AUTO: NORMAL
SP GR UR STRIP.AUTO: 1.02
UROBILINOGEN UR STRIP-MCNC: 0.2 MG/DL

## 2025-05-13 PROCEDURE — 92250 FUNDUS PHOTOGRAPHY W/I&R: CPT | Mod: 26 | Performed by: PHYSICIAN ASSISTANT

## 2025-05-13 PROCEDURE — 90471 IMMUNIZATION ADMIN: CPT | Performed by: PHYSICIAN ASSISTANT

## 2025-05-13 PROCEDURE — 87086 URINE CULTURE/COLONY COUNT: CPT

## 2025-05-13 PROCEDURE — 87186 SC STD MICRODIL/AGAR DIL: CPT

## 2025-05-13 PROCEDURE — 3079F DIAST BP 80-89 MM HG: CPT | Performed by: PHYSICIAN ASSISTANT

## 2025-05-13 PROCEDURE — 81002 URINALYSIS NONAUTO W/O SCOPE: CPT | Performed by: PHYSICIAN ASSISTANT

## 2025-05-13 PROCEDURE — 90677 PCV20 VACCINE IM: CPT | Performed by: PHYSICIAN ASSISTANT

## 2025-05-13 PROCEDURE — 99214 OFFICE O/P EST MOD 30 MIN: CPT | Mod: 25 | Performed by: PHYSICIAN ASSISTANT

## 2025-05-13 PROCEDURE — 90472 IMMUNIZATION ADMIN EACH ADD: CPT | Performed by: PHYSICIAN ASSISTANT

## 2025-05-13 PROCEDURE — 90651 9VHPV VACCINE 2/3 DOSE IM: CPT | Mod: JZ | Performed by: PHYSICIAN ASSISTANT

## 2025-05-13 PROCEDURE — 3074F SYST BP LT 130 MM HG: CPT | Performed by: PHYSICIAN ASSISTANT

## 2025-05-13 PROCEDURE — 87077 CULTURE AEROBIC IDENTIFY: CPT

## 2025-05-13 RX ORDER — CALCIUM CITRATE/VITAMIN D3 200MG-6.25
TABLET ORAL
COMMUNITY
Start: 2021-06-14

## 2025-05-13 RX ORDER — SULFAMETHOXAZOLE AND TRIMETHOPRIM 800; 160 MG/1; MG/1
1 TABLET ORAL 2 TIMES DAILY
Qty: 6 TABLET | Refills: 0 | Status: SHIPPED | OUTPATIENT
Start: 2025-05-13 | End: 2025-05-16

## 2025-05-13 ASSESSMENT — FIBROSIS 4 INDEX: FIB4 SCORE: 0.39

## 2025-05-13 ASSESSMENT — PATIENT HEALTH QUESTIONNAIRE - PHQ9: CLINICAL INTERPRETATION OF PHQ2 SCORE: 0

## 2025-05-13 NOTE — PROGRESS NOTES
Verbal consent was acquired by the patient to use [employer] ambient listening note generation during this visit     Subjective:     HPI:   History of Present Illness  The patient, a 25-year-old female, presents to establish care and address dysuria.    She reports cloudy urine with an unusual odor and mild discomfort, raising suspicion of a urinary tract infection (UTI). She denies associated symptoms such as abdominal pain, nausea, fever, or vaginal discharge. She has a history of UTI in January 2025, which was treated with antibiotics; however, she believes the infection was not fully eradicated.    The patient is under the care of an endocrinologist for type 1 diabetes mellitus, diagnosed at age 10. Her most recent consultation occurred in February 2025, with an HbA1c of 8.5%. She utilizes an insulin pump and has a follow-up appointment scheduled with her endocrinologist. She is also due for an ophthalmologic examination and laboratory testing next week. She notes that a previous insulin pump caused significant anxiety, leading to its removal.    She reports chronic knee pain persisting for several years, severe enough to impair squatting and bending. She describes a crunching sensation and occasional soreness during ambulation. A physical therapist previously identified patellar misalignment; however, therapy was discontinued due to insurance limitations. A knee radiograph performed in February 2024 revealed no abnormalities.    The patient removed her breast piercings due to non-healing and continues to observe purulent discharge from the piercing sites for several months. She denies tenderness or pain in the affected areas.    Health Maintenance: Completed    ROS:  Gen: no fevers/chills  Eyes: no changes in vision  ENT: no sore throat  Pulm: no sob, no cough  CV: no chest pain  GI: no nausea/vomiting  : no dysuria  MSk: no myalgias  Skin: no rash  Neuro: no headaches    Objective:     Exam:  /82 (BP  "Location: Left arm, Patient Position: Sitting, BP Cuff Size: Adult)   Pulse 92   Temp 36.9 °C (98.5 °F) (Temporal)   Resp 16   Ht 1.626 m (5' 4\")   Wt 79.8 kg (176 lb)   SpO2 98%   BMI 30.21 kg/m²  Body mass index is 30.21 kg/m².    PHYSICAL EXAM  Constitutional: Alert, no distress, well-groomed.  Skin: Warm, dry, good turgor, no rashes in visible areas.  Eye: Equal, round and reactive, conjunctiva clear, lids normal.  ENMT: Lips without lesions, good dentition, moist mucous membranes.  Neck: Trachea midline, no masses, no thyromegaly.  Respiratory: Unlabored respiratory effort, no cough.  MSK: Normal gait, moves all extremities.  Neuro: Grossly non-focal.   Psych: Alert and oriented x3, normal affect and mood.    Results  Labs reviewed and discussed     Assessment & Plan:     1. Dysuria  POCT Urinalysis    URINE CULTURE(NEW)    URINALYSIS    sulfamethoxazole-trimethoprim (BACTRIM DS) 800-160 MG tablet      2. Need for vaccination  Pneumococcal Conjugate Vaccine 20-Valent (6 wks+)    9VHPV Vaccine 2-3 Dose (GARDASIL 9)      3. Type 1 diabetes mellitus without complication (HCC)  Diabetic Monofilament LE Exam    POCT Retinal Eye Exam    MICROALBUMIN CREAT RATIO URINE    HEMOGLOBIN A1C      4. Screening for endocrine, metabolic and immunity disorder  Comp Metabolic Panel    VITAMIN D,25 HYDROXY (DEFICIENCY)    Lipid Profile    TSH WITH REFLEX TO FT4    CBC WITHOUT DIFFERENTIAL      5. Chronic pain of both knees  Referral to Physical Therapy      6. Screen for STD (sexually transmitted disease)  Chlamydia/GC, PCR (Urine)      7. Nipple discharge          Assessment & Plan        1. Dysuria   Acute.  - Urine sample abnormal in the office   - Prescribe antibiotics.  - Send urine culture for further analysis.  - Repeat urine test after completing antibiotics to ensure resolution given that she does not think previous infection was cleared.     - POCT Urinalysis  - URINE CULTURE(NEW); Future  - URINALYSIS; Future  - " sulfamethoxazole-trimethoprim (BACTRIM DS) 800-160 MG tablet; Take 1 Tablet by mouth 2 times a day for 3 days.  Dispense: 6 Tablet; Refill: 0    2. Need for vaccination  - Pneumococcal Conjugate Vaccine 20-Valent (6 wks+)  - 9VHPV Vaccine 2-3 Dose (GARDASIL 9)    3. Type 1 diabetes mellitus without complication (HCC)  Chronic, not well-controlled.  Currently managed by endocrinology.  Most recent A1c of 8.2 which actually has improved.  Recently started on insulin pump.  Patient encouraged to make follow-up appointment with endocrinology but will update her care gaps today.  - Diabetic Monofilament LE Exam  - POCT Retinal Eye Exam  - MICROALBUMIN CREAT RATIO URINE; Future  - HEMOGLOBIN A1C; Future    4. Screening for endocrine, metabolic and immunity disorder  - Comp Metabolic Panel; Future  - VITAMIN D,25 HYDROXY (DEFICIENCY); Future  - Lipid Profile; Future  - TSH WITH REFLEX TO FT4; Future  - CBC WITHOUT DIFFERENTIAL; Future    5. Chronic pain of both knees  Chronic, worsening  - Previous x-rays unremarkable.  - Refer to orthopedics for further evaluation.  - Initiate physical therapy   - Referral to Physical Therapy    6. Screen for STD (sexually transmitted disease)    - Chlamydia/GC, PCR (Urine); Future    7. Nipple discharge  - Persistent discharge from previous piercing sites without tenderness or signs of infection.  - Monitor for signs of infection, including redness or pain.   - Very small amount of thick white discharge expressed today,   - Continued observation recommended.    8. Health Maintenance.  - Overdue for Pap smear and gonorrhea and Chlamydia screening.  - Advise scheduling these tests.  - Order regular labs to include these screenings.  - Administer vaccines today.    I spent a total of 35 minutes with record review, exam, communication with the patient, communication with other providers, and documentation of this encounter.    Please note that this dictation was created using voice  recognition software. I have made every reasonable attempt to correct obvious errors, but I expect that there are errors of grammar and possibly content that I did not discover before finalizing the note.

## 2025-05-14 ENCOUNTER — RESULTS FOLLOW-UP (OUTPATIENT)
Dept: MEDICAL GROUP | Facility: PHYSICIAN GROUP | Age: 25
End: 2025-05-14

## 2025-05-14 LAB — RETINAL SCREEN: NEGATIVE

## 2025-05-19 ENCOUNTER — RESULTS FOLLOW-UP (OUTPATIENT)
Dept: MEDICAL GROUP | Facility: PHYSICIAN GROUP | Age: 25
End: 2025-05-19

## 2025-05-20 NOTE — Clinical Note
REFERRAL APPROVAL NOTICE         Sent on May 20, 2025                   Danni Smiley  [address] [facility] [address]                   Dear Ms. Smiley,    After a careful review of the medical information and benefit coverage, [facility] has processed your referral. See below for additional details.    If applicable, you must be actively enrolled with your insurance for coverage of the authorized service. If you have any questions regarding your coverage, please contact your insurance directly.    REFERRAL INFORMATION   Referral #:  [id number]  Referred-To Department    Referred-By Provider:  Physical Therapy    Gisele Rooney P.A.-C.   [facility]      [address]  [facility] [id number] [phone] [address]  [facility] [address]  [phone]    Referral Start Date:  05/13/2025  Referral End Date:   05/13/2026             SCHEDULING  If you do not already have an appointment, please call [phone] to make an appointment.     MORE INFORMATION  If you do not already have a [employer] account, sign up at: [email or web address]  You can access your medical information, make appointments, see lab results, billing information, and more.  If you have questions regarding this referral, please contact  the [facility] Referrals department at:             [phone]. Monday - Friday 8:00AM - 5:00PM.     Sincerely,    [facility]